# Patient Record
Sex: MALE | Race: WHITE | NOT HISPANIC OR LATINO | Employment: FULL TIME | URBAN - METROPOLITAN AREA
[De-identification: names, ages, dates, MRNs, and addresses within clinical notes are randomized per-mention and may not be internally consistent; named-entity substitution may affect disease eponyms.]

---

## 2017-01-09 ENCOUNTER — COMMUNICATION - HEALTHEAST (OUTPATIENT)
Dept: INTERNAL MEDICINE | Facility: CLINIC | Age: 52
End: 2017-01-09

## 2017-06-20 RX ORDER — MULTIPLE VITAMINS W/ MINERALS TAB 9MG-400MCG
1 TAB ORAL DAILY
COMMUNITY

## 2017-06-21 ENCOUNTER — HOSPITAL ENCOUNTER (OUTPATIENT)
Facility: CLINIC | Age: 52
Discharge: HOME | End: 2017-06-21
Attending: COLON & RECTAL SURGERY | Admitting: COLON & RECTAL SURGERY
Payer: COMMERCIAL

## 2017-06-21 ENCOUNTER — RECORDS - HEALTHEAST (OUTPATIENT)
Dept: ADMINISTRATIVE | Facility: OTHER | Age: 52
End: 2017-06-21

## 2017-06-21 VITALS
SYSTOLIC BLOOD PRESSURE: 128 MMHG | HEIGHT: 69 IN | RESPIRATION RATE: 25 BRPM | BODY MASS INDEX: 29.62 KG/M2 | WEIGHT: 200 LBS | OXYGEN SATURATION: 90 % | DIASTOLIC BLOOD PRESSURE: 91 MMHG

## 2017-06-21 PROCEDURE — 25000128 H RX IP 250 OP 636: Performed by: COLON & RECTAL SURGERY

## 2017-06-21 PROCEDURE — 99153 MOD SED SAME PHYS/QHP EA: CPT | Performed by: COLON & RECTAL SURGERY

## 2017-06-21 PROCEDURE — 45378 DIAGNOSTIC COLONOSCOPY: CPT | Performed by: COLON & RECTAL SURGERY

## 2017-06-21 PROCEDURE — G0121 COLON CA SCRN NOT HI RSK IND: HCPCS | Performed by: COLON & RECTAL SURGERY

## 2017-06-21 PROCEDURE — 25000125 ZZHC RX 250: Performed by: COLON & RECTAL SURGERY

## 2017-06-21 PROCEDURE — G0500 MOD SEDAT ENDO SERVICE >5YRS: HCPCS | Performed by: COLON & RECTAL SURGERY

## 2017-06-21 RX ORDER — ONDANSETRON 2 MG/ML
4 INJECTION INTRAMUSCULAR; INTRAVENOUS EVERY 6 HOURS PRN
Status: DISCONTINUED | OUTPATIENT
Start: 2017-06-21 | End: 2017-06-21 | Stop reason: HOSPADM

## 2017-06-21 RX ORDER — ONDANSETRON 4 MG/1
4 TABLET, ORALLY DISINTEGRATING ORAL EVERY 6 HOURS PRN
Status: DISCONTINUED | OUTPATIENT
Start: 2017-06-21 | End: 2017-06-21 | Stop reason: HOSPADM

## 2017-06-21 RX ORDER — FLUMAZENIL 0.1 MG/ML
0.2 INJECTION, SOLUTION INTRAVENOUS
Status: DISCONTINUED | OUTPATIENT
Start: 2017-06-21 | End: 2017-06-21 | Stop reason: HOSPADM

## 2017-06-21 RX ORDER — METOCLOPRAMIDE HYDROCHLORIDE 5 MG/ML
10 INJECTION INTRAMUSCULAR; INTRAVENOUS EVERY 6 HOURS PRN
Status: DISCONTINUED | OUTPATIENT
Start: 2017-06-21 | End: 2017-06-21 | Stop reason: HOSPADM

## 2017-06-21 RX ORDER — METOCLOPRAMIDE 10 MG/1
10 TABLET ORAL EVERY 6 HOURS PRN
Status: DISCONTINUED | OUTPATIENT
Start: 2017-06-21 | End: 2017-06-21 | Stop reason: HOSPADM

## 2017-06-21 RX ORDER — NALOXONE HYDROCHLORIDE 0.4 MG/ML
.1-.4 INJECTION, SOLUTION INTRAMUSCULAR; INTRAVENOUS; SUBCUTANEOUS
Status: DISCONTINUED | OUTPATIENT
Start: 2017-06-21 | End: 2017-06-21 | Stop reason: HOSPADM

## 2017-06-21 RX ORDER — ONDANSETRON 2 MG/ML
4 INJECTION INTRAMUSCULAR; INTRAVENOUS
Status: DISCONTINUED | OUTPATIENT
Start: 2017-06-21 | End: 2017-06-21 | Stop reason: HOSPADM

## 2017-06-21 RX ORDER — PROCHLORPERAZINE MALEATE 5 MG
5-10 TABLET ORAL EVERY 6 HOURS PRN
Status: DISCONTINUED | OUTPATIENT
Start: 2017-06-21 | End: 2017-06-21 | Stop reason: HOSPADM

## 2017-06-21 RX ORDER — LIDOCAINE 40 MG/G
CREAM TOPICAL
Status: DISCONTINUED | OUTPATIENT
Start: 2017-06-21 | End: 2017-06-21 | Stop reason: HOSPADM

## 2017-06-21 RX ORDER — FENTANYL CITRATE 50 UG/ML
INJECTION, SOLUTION INTRAMUSCULAR; INTRAVENOUS PRN
Status: DISCONTINUED | OUTPATIENT
Start: 2017-06-21 | End: 2017-06-21 | Stop reason: HOSPADM

## 2017-06-21 NOTE — H&P
"Pre-Endoscopy History and Physical   Rashawn Alonso MRN# [id number]   YOB: 1965 Age: 52 year old   Date of Procedure: 6/21/2017   Primary care provider: Rashawn Morgan   Type of Endoscopy: colonoscopy   Reason for Procedure: colonoscopy   Type of Anesthesia Anticipated: Moderate Sedation   HPI:   Rashawn is a 52 year old male for screening colonoscopy.  He last had a colonoscopy in 2007 which was normal.  He denies BRBPR, abdominal pain, nausea/vomiting, changes in bowel habits or unintentional weight loss.  He denies a FH of CRC.    No Known Allergies   Prior to Admission Medications   Prescriptions Last Dose Informant Patient Reported? Taking?   ASPIRIN PO 6/19/2017 at    Yes Yes   Sig: Take 81 mg by mouth daily   ATORVASTATIN CALCIUM PO 6/20/2017  Yes Yes   Sig: Take 10 mg by mouth daily   DOXYCYCLINE HYCLATE PO 6/21/2017  Yes Yes   Sig: Take 50 mg by mouth daily   multivitamin, therapeutic with minerals (MULTI-VITAMIN) TABS tablet 6/14/2017  Yes Yes   Sig: Take 1 tablet by mouth daily      Facility-Administered Medications: None      There is no problem list on file for this patient.     Past Medical History:   Diagnosis Date     A-fib (H)     ablation     Hypercholesteremia       Past Surgical History:   Procedure Laterality Date     COLONOSCOPY       EP ABLATION / EP STUDIES      for afib     HERNIA REPAIR        Social History   Substance Use Topics     Smoking status: Never Smoker     Smokeless tobacco: Not on file     Alcohol use Yes      Comment: social      History reviewed. No pertinent family history.   PHYSICAL EXAM:   /87  Resp 12  Ht 1.753 m (5' 9\")  Wt 90.7 kg (200 lb)  SpO2 94%  BMI 29.53 kg/m2 Estimated body mass index is 29.53 kg/(m^2) as calculated from the following:    Height as of this encounter: 1.753 m (5' 9\").    Weight as of this encounter: 90.7 kg (200 lb).   RESP: lungs clear to auscultation - no rales, rhonchi or wheezes   CV: regular rates and rhythm   ASA Class " 2 - Mild systemic disease    Assessment: 53 y/o gentleman for screening colonoscopy    Plan: Colonoscopy with moderate sedation.  Risks of the procedure were discussed including, but not limited to, bleeding, perforation and missed lesions.  Patient understands and is willing to proceed.    Brian Mistry MD ....................  6/21/2017   4:34 PM  Colon and Rectal Surgery Staff  [phone]

## 2017-06-22 LAB — COLONOSCOPY: NORMAL

## 2018-02-24 ENCOUNTER — COMMUNICATION - HEALTHEAST (OUTPATIENT)
Dept: INTERNAL MEDICINE | Facility: CLINIC | Age: 53
End: 2018-02-24

## 2018-04-17 ENCOUNTER — COMMUNICATION - HEALTHEAST (OUTPATIENT)
Dept: SCHEDULING | Facility: CLINIC | Age: 53
End: 2018-04-17

## 2018-05-22 ENCOUNTER — COMMUNICATION - HEALTHEAST (OUTPATIENT)
Dept: INTERNAL MEDICINE | Facility: CLINIC | Age: 53
End: 2018-05-22

## 2018-05-23 ENCOUNTER — OFFICE VISIT - HEALTHEAST (OUTPATIENT)
Dept: INTERNAL MEDICINE | Facility: CLINIC | Age: 53
End: 2018-05-23

## 2018-05-23 ENCOUNTER — COMMUNICATION - HEALTHEAST (OUTPATIENT)
Dept: INTERNAL MEDICINE | Facility: CLINIC | Age: 53
End: 2018-05-23

## 2018-05-23 DIAGNOSIS — Z00.00 HEALTH CARE MAINTENANCE: ICD-10-CM

## 2018-05-23 DIAGNOSIS — E78.5 HYPERLIPIDEMIA: ICD-10-CM

## 2018-05-23 DIAGNOSIS — S46.219A BICEPS RUPTURE, DISTAL: ICD-10-CM

## 2018-05-23 DIAGNOSIS — I34.0: ICD-10-CM

## 2018-05-23 LAB
ALBUMIN SERPL-MCNC: 4.1 G/DL (ref 3.5–5)
ALBUMIN UR-MCNC: NEGATIVE MG/DL
ALP SERPL-CCNC: 83 U/L (ref 45–120)
ALT SERPL W P-5'-P-CCNC: 30 U/L (ref 0–45)
ANION GAP SERPL CALCULATED.3IONS-SCNC: 12 MMOL/L (ref 5–18)
APPEARANCE UR: CLEAR
AST SERPL W P-5'-P-CCNC: 27 U/L (ref 0–40)
ATRIAL RATE - MUSE: 65 BPM
BASOPHILS # BLD AUTO: 0 THOU/UL (ref 0–0.2)
BASOPHILS NFR BLD AUTO: 1 % (ref 0–2)
BILIRUB DIRECT SERPL-MCNC: 0.3 MG/DL
BILIRUB SERPL-MCNC: 1.2 MG/DL (ref 0–1)
BILIRUB UR QL STRIP: NEGATIVE
BUN SERPL-MCNC: 13 MG/DL (ref 8–22)
CALCIUM SERPL-MCNC: 9.5 MG/DL (ref 8.5–10.5)
CHLORIDE BLD-SCNC: 106 MMOL/L (ref 98–107)
CHOLEST SERPL-MCNC: 158 MG/DL
CO2 SERPL-SCNC: 23 MMOL/L (ref 22–31)
COLOR UR AUTO: YELLOW
CREAT SERPL-MCNC: 0.75 MG/DL (ref 0.7–1.3)
DIASTOLIC BLOOD PRESSURE - MUSE: NORMAL MMHG
EOSINOPHIL # BLD AUTO: 0.1 THOU/UL (ref 0–0.4)
EOSINOPHIL NFR BLD AUTO: 2 % (ref 0–6)
ERYTHROCYTE [DISTWIDTH] IN BLOOD BY AUTOMATED COUNT: 12.8 % (ref 11–14.5)
ERYTHROCYTE [SEDIMENTATION RATE] IN BLOOD BY WESTERGREN METHOD: 9 MM/HR (ref 0–15)
FASTING STATUS PATIENT QL REPORTED: YES
GFR SERPL CREATININE-BSD FRML MDRD: >60 ML/MIN/1.73M2
GLUCOSE BLD-MCNC: 81 MG/DL (ref 70–125)
GLUCOSE UR STRIP-MCNC: NEGATIVE MG/DL
HCT VFR BLD AUTO: 44.8 % (ref 40–54)
HDLC SERPL-MCNC: 42 MG/DL
HGB BLD-MCNC: 15 G/DL (ref 14–18)
HGB UR QL STRIP: NEGATIVE
INTERPRETATION ECG - MUSE: NORMAL
KETONES UR STRIP-MCNC: NEGATIVE MG/DL
LDLC SERPL CALC-MCNC: 99 MG/DL
LEUKOCYTE ESTERASE UR QL STRIP: NEGATIVE
LYMPHOCYTES # BLD AUTO: 1.4 THOU/UL (ref 0.8–4.4)
LYMPHOCYTES NFR BLD AUTO: 24 % (ref 20–40)
MCH RBC QN AUTO: 31 PG (ref 27–34)
MCHC RBC AUTO-ENTMCNC: 33.4 G/DL (ref 32–36)
MCV RBC AUTO: 93 FL (ref 80–100)
MONOCYTES # BLD AUTO: 0.7 THOU/UL (ref 0–0.9)
MONOCYTES NFR BLD AUTO: 12 % (ref 2–10)
NEUTROPHILS # BLD AUTO: 3.5 THOU/UL (ref 2–7.7)
NEUTROPHILS NFR BLD AUTO: 61 % (ref 50–70)
NITRATE UR QL: NEGATIVE
P AXIS - MUSE: 71 DEGREES
PH UR STRIP: 6 [PH] (ref 5–8)
PLATELET # BLD AUTO: 256 THOU/UL (ref 140–440)
PMV BLD AUTO: 7.2 FL (ref 7–10)
POTASSIUM BLD-SCNC: 4.4 MMOL/L (ref 3.5–5)
PR INTERVAL - MUSE: 164 MS
PROT SERPL-MCNC: 7.4 G/DL (ref 6–8)
PSA SERPL-MCNC: 0.9 NG/ML (ref 0–3.5)
QRS DURATION - MUSE: 92 MS
QT - MUSE: 420 MS
QTC - MUSE: 436 MS
R AXIS - MUSE: -9 DEGREES
RBC # BLD AUTO: 4.83 MILL/UL (ref 4.4–6.2)
SODIUM SERPL-SCNC: 141 MMOL/L (ref 136–145)
SP GR UR STRIP: 1.01 (ref 1–1.03)
SYSTOLIC BLOOD PRESSURE - MUSE: NORMAL MMHG
T AXIS - MUSE: 12 DEGREES
TRIGL SERPL-MCNC: 87 MG/DL
TSH SERPL DL<=0.005 MIU/L-ACNC: 4.73 UIU/ML (ref 0.3–5)
UROBILINOGEN UR STRIP-ACNC: NORMAL
VENTRICULAR RATE- MUSE: 65 BPM
WBC: 5.7 THOU/UL (ref 4–11)

## 2018-05-23 ASSESSMENT — MIFFLIN-ST. JEOR: SCORE: 1666.52

## 2018-05-24 ENCOUNTER — COMMUNICATION - HEALTHEAST (OUTPATIENT)
Dept: INTERNAL MEDICINE | Facility: CLINIC | Age: 53
End: 2018-05-24

## 2018-06-15 ENCOUNTER — RECORDS - HEALTHEAST (OUTPATIENT)
Dept: ADMINISTRATIVE | Facility: OTHER | Age: 53
End: 2018-06-15

## 2018-06-20 ENCOUNTER — COMMUNICATION - HEALTHEAST (OUTPATIENT)
Dept: INTERNAL MEDICINE | Facility: CLINIC | Age: 53
End: 2018-06-20

## 2019-05-09 ENCOUNTER — COMMUNICATION - HEALTHEAST (OUTPATIENT)
Dept: INTERNAL MEDICINE | Facility: CLINIC | Age: 54
End: 2019-05-09

## 2019-05-09 DIAGNOSIS — E78.5 HYPERLIPIDEMIA: ICD-10-CM

## 2020-05-26 ENCOUNTER — COMMUNICATION - HEALTHEAST (OUTPATIENT)
Dept: INTERNAL MEDICINE | Facility: CLINIC | Age: 55
End: 2020-05-26

## 2020-05-26 DIAGNOSIS — E78.5 HYPERLIPIDEMIA: ICD-10-CM

## 2020-09-14 ENCOUNTER — OFFICE VISIT - HEALTHEAST (OUTPATIENT)
Dept: INTERNAL MEDICINE | Facility: CLINIC | Age: 55
End: 2020-09-14

## 2020-09-14 DIAGNOSIS — Z12.5 PROSTATE CANCER SCREENING: ICD-10-CM

## 2020-09-14 DIAGNOSIS — R03.0 ELEVATED BLOOD PRESSURE READING WITHOUT DIAGNOSIS OF HYPERTENSION: ICD-10-CM

## 2020-09-14 DIAGNOSIS — E78.5 HYPERLIPIDEMIA: ICD-10-CM

## 2020-09-14 DIAGNOSIS — Z23 NEED FOR DIPHTHERIA-TETANUS-PERTUSSIS (TDAP) VACCINE: ICD-10-CM

## 2020-09-14 LAB
CHOLEST SERPL-MCNC: 174 MG/DL
FASTING STATUS PATIENT QL REPORTED: YES
HDLC SERPL-MCNC: 43 MG/DL
LDLC SERPL CALC-MCNC: 110 MG/DL
PSA SERPL-MCNC: 0.8 NG/ML (ref 0–3.5)
TRIGL SERPL-MCNC: 104 MG/DL

## 2020-09-14 ASSESSMENT — MIFFLIN-ST. JEOR: SCORE: 1720.4

## 2020-09-15 ENCOUNTER — COMMUNICATION - HEALTHEAST (OUTPATIENT)
Dept: INTERNAL MEDICINE | Facility: CLINIC | Age: 55
End: 2020-09-15

## 2020-11-13 ENCOUNTER — COMMUNICATION - HEALTHEAST (OUTPATIENT)
Dept: INTERNAL MEDICINE | Facility: CLINIC | Age: 55
End: 2020-11-13

## 2020-11-13 DIAGNOSIS — I34.0: ICD-10-CM

## 2021-05-28 NOTE — TELEPHONE ENCOUNTER
Due to be seen    Rx renewed per Medication Renewal Policy. Medication was last renewed on 5/23/18.    Justa Clark, [facility] Triage/Med Refill 5/10/2019     Requested Prescriptions   Pending Prescriptions Disp Refills     atorvastatin (LIPITOR) 10 MG tablet [Pharmacy Med Name: ATORVASTATIN  10MG  TAB] 90 tablet 3     Sig: TAKE 1 TABLET BY MOUTH AT  BEDTIME       Statins Refill Protocol (Hmg CoA Reductase Inhibitors) Passed - 5/9/2019  8:03 PM        Passed - PCP or prescribing provider visit in past 12 months      Last office visit with prescriber/PCP: Visit date not found OR same dept: Visit date not found OR same specialty: Visit date not found  Last physical: 5/23/2018 Last MTM visit: Visit date not found   Next visit within 3 mo: Visit date not found  Next physical within 3 mo: Visit date not found  Prescriber OR PCP: Rashawn Morgan MD  Last diagnosis associated with med order: 1. Hyperlipidemia  - atorvastatin (LIPITOR) 10 MG tablet [Pharmacy Med Name: ATORVASTATIN  10MG  TAB]; TAKE 1 TABLET BY MOUTH AT  BEDTIME  Dispense: 90 tablet; Refill: 3    If protocol passes may refill for 12 months if within 3 months of last provider visit (or a total of 15 months).

## 2021-06-01 VITALS — BODY MASS INDEX: 29.27 KG/M2 | WEIGHT: 193.12 LBS | HEIGHT: 68 IN

## 2021-06-05 VITALS
DIASTOLIC BLOOD PRESSURE: 84 MMHG | HEIGHT: 68 IN | OXYGEN SATURATION: 97 % | BODY MASS INDEX: 31.07 KG/M2 | TEMPERATURE: 97.3 F | SYSTOLIC BLOOD PRESSURE: 132 MMHG | WEIGHT: 205 LBS | HEART RATE: 64 BPM

## 2021-06-08 NOTE — TELEPHONE ENCOUNTER
RN cannot approve Refill Request    RN can NOT refill this medication Protocol failed and NO refill given.      Justa Clark, [facility] Triage/Med Refill 5/28/2020    Requested Prescriptions   Pending Prescriptions Disp Refills     atorvastatin (LIPITOR) 10 MG tablet [Pharmacy Med Name: ATORVASTATIN TAB 10MG] 90 tablet 3     Sig: TAKE 1 TABLET AT BEDTIME       Statins Refill Protocol (Hmg CoA Reductase Inhibitors) Failed - 5/26/2020  8:14 AM        Failed - PCP or prescribing provider visit in past 12 months      Last office visit with prescriber/PCP: Visit date not found OR same dept: Visit date not found OR same specialty: Visit date not found  Last physical: 5/23/2018 Last MTM visit: Visit date not found   Next visit within 3 mo: Visit date not found  Next physical within 3 mo: Visit date not found  Prescriber OR PCP: Rashawn Morgan MD  Last diagnosis associated with med order: 1. Hyperlipidemia  - atorvastatin (LIPITOR) 10 MG tablet [Pharmacy Med Name: ATORVASTATIN TAB 10MG]; TAKE 1 TABLET AT BEDTIME  Dispense: 90 tablet; Refill: 3    If protocol passes may refill for 12 months if within 3 months of last provider visit (or a total of 15 months).

## 2021-06-11 NOTE — PROGRESS NOTES
[facility] Note  Rashawn Alonso   55 y.o. male    Date of Visit: 9/14/2020  Chief Complaint   Patient presents with     Establish Care     fasting     Assessment/Plan  1. Hyperlipidemia  Very well controlled on atorvastatin 10 mg daily.  Unable to find the initial LDL pre-taking statin, which he appears to been on since 2014.  Also continue to take 81 milligrams aspirin.  LDL stable at 110, HDL 43.  - atorvastatin (LIPITOR) 10 MG tablet; Take 1 tablet (10 mg total) by mouth at bedtime.  Dispense: 90 tablet; Refill: 3  - Lipid [place] FASTING    2. Need for diphtheria-tetanus-pertussis (Tdap) vaccine  - Tdap vaccine,  8yo or older,  IM    3. Prostate cancer screening  Stable, PSA 0.8  - PSA, Annual Screen (Prostatic-Specific Antigen)    4. Elevated blood pressure reading without diagnosis of hypertension  Optimal 10-year ASCVD event risk of 4.3%, currently at 6.9%.  Provided patient instructions on dietary and lifestyle changes to help improve his blood pressure.     Much or all of the text in this note was generated through the use of Dragon Dictate voice-to-text software. Errors in spelling or words which seem out of context are unintentional. Sound alike errors, in particular, may have escaped editing  Juan Manuel Yee MD    Return in about 1 year (around 9/14/2021), or if symptoms worsen or fail to improve.    Subjective  This 55 y.o. old male. Here to establish care. 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 Endorses 10-15 lb weight gain. Is a grandfather of a year old; his first grandchild. Wife just retired in July and his goal is to retire when 58 YOA. TTE in 2013 with EF 55% and mild to moderate mitral insufficiency. History of AFIb s/p ablations. Next colo is due in June 2027. Will have 1-2 diet cokes in the AM.  Denies past medical history of hypertension.  From chart review, does have documented elevated systolic blood pressures in the 120s, with periodic episodes in the 130s.   Denies any chest pain, SOB, fever, sweats or chills, nausea or palpitations.    ROS A comprehensive review of systems was performed and was otherwise negative    Medications, allergies, and problem list were reviewed and updated    Exam  General appearance: Pleasant, nontoxic-appearing, no acute distress, alert and oriented x4  Vitals:    09/14/20 1004   BP: 132/84   Pulse: 64   Temp: 97.3  F (36.3  C)   SpO2: 97%   EYES: Eyelids, conjunctiva, and sclera were normal.   RESPIRATORY: Bilaterally with no crackles, wheezing or rhonchi  CARDIOVASCULAR: Regular S1 and S2.  Radial pulses intact.  No edema.  GASTROINTESTINAL: NABS, soft, nontender, nondistended, no hepatomegaly  MUSCULOSKELETAL: Skeletal configuration was normal and muscle mass was normal for age. Joint appearance was overall normal.   SKIN/HAIR/NAILS: Skin color was normal.    Hair and nails were normal.  NEUROLOGIC: Alert and oriented to person, place, time, and circumstance. Speech was normal. Motor strength was normal for age   PSYCHIATRIC:  Mood and affect were normal and the patient had normal recent and remote memory.   Additional Information   Current Outpatient Medications   Medication Sig Dispense Refill     amoxicillin (AMOXIL) 500 MG tablet Take 1 tablet (500 mg total) by mouth daily. Take 4 tablet prior to dental work 12 tablet 3     aspirin 81 MG EC tablet Take 81 mg by mouth daily.       atorvastatin (LIPITOR) 10 MG tablet Take 1 tablet (10 mg total) by mouth at bedtime. 90 tablet 3     doxycycline (MONODOX) 50 MG capsule Take 50 mg by mouth daily. For blepharitis       MULTIVITAMIN (MULTIPLE VITAMIN ORAL) Take 1 tablet by mouth daily.       No current facility-administered medications for this visit.      No Known Allergies  Social History     Social History Narrative     - Perla -1991    Philipp-1992    Ian-1995    [employer]           Social History     Tobacco Use     Smoking status: Never  Smoker     Smokeless tobacco: Never Used   Substance Use Topics     Alcohol use: Not on file     Drug use: Not on file     Family History   Problem Relation Age of Onset     Heart disease Father      Other Father         bypass surgery     Diverticulitis Father      Hypertension Mother      Stroke Mother      Atrial fibrillation Mother      Kidney cancer Mother      Breast cancer Sister      Ovarian cancer Unknown         Family     Leukemia Sister         Acute     Past Surgical History:   Procedure Laterality Date     BILATERAL INGUINAL HERNIORRHAPY       RADIOFREQUENCY ABLATION      X 2     UMBILICAL HERNIA REPAIR       VASECTOMY      Time: total time spent with the patient was 25 minutes of which >50% was spent in counseling and coordination of care

## 2021-06-13 NOTE — TELEPHONE ENCOUNTER
Dr Park,     If ok please sign, thank    Edie Parks CMA ([facility])

## 2021-06-13 NOTE — TELEPHONE ENCOUNTER
Medication Request  Medication name:   amoxicillin (AMOXIL) 500 MG tablet 12 tablet 3 5/23/2018     Sig - Route: Take 1 tablet (500 mg total) by mouth daily. Take 4 tablet prior to dental work - Oral    Sent to pharmacy as: amoxicillin (AMOXIL) 500 MG tablet    E-Prescribing Status: Receipt confirmed by pharmacy (5/23/2018  2:05 PM CDT)        Requested [facility]acy: [facility]  Reason for request:   Upcoming dental work.  When did you use medication last?:    Unknown  Patient offered appointment:  patient declined  Okay to leave a detailed message: yes

## 2021-06-16 PROBLEM — R03.0 ELEVATED BLOOD PRESSURE READING WITHOUT DIAGNOSIS OF HYPERTENSION: Status: ACTIVE | Noted: 2020-09-15

## 2021-06-16 PROBLEM — E78.5 HYPERLIPIDEMIA: Status: ACTIVE | Noted: 2020-09-15

## 2021-06-18 NOTE — PROGRESS NOTES
HISTORY/PHYSICAL EXAM  Rashawn Alonso   53 y.o. male  Is here for a physical healthcare maintenance examination        Assessment/Plan for  Rashawn Alonso is a 53 y.o. male.       1.  Healthcare maintenance examination of the man healthy lifestyle  2.  Mitral valve insufficiency mild to moderate with ejection fraction of 50-55% 4 years ago.  We should recheck this.  Is a family history of clinically significant mitral regurg.  Refill amoxicillin.  3.  Blepharitis-on chronic doxycycline per ophthalmologist  4.  Hyperlipidemia-on Rx      Plan:  1.  Routine lab  2.  Amoxicillin refill  3.  Cardiac echo at [place]           There are no Patient Instructions on file for this visit.    Diagnoses and all orders for this visit:    Mitral valvular insufficiency  -     amoxicillin (AMOXIL) 500 MG tablet; Take 1 tablet (500 mg total) by mouth daily. Take 4 tablet prior to dental work  Dispense: 12 tablet; Refill: 3  -     Echo Complete; Future; Expected date: 5/23/18    Health care maintenance  -     [facility](CBC and Differential)  -     Erythrocyte Sedimentation Rate  -     Urinalysis-UC if Indicated  -     Basic Metabolic Panel  -     Hepatic Profile  -     Lipid [place]  -     Thyroid [place]  -     PSA (Prostatic-Specific Antigen), Annual Screen  -     Electrocardiogram Perform - Clinic  -     [facility] (CBC with Diff)    Hyperlipidemia  -     atorvastatin (LIPITOR) 10 MG tablet; Take 1 tablet (10 mg total) by mouth at bedtime.  Dispense: 90 tablet; Refill: 3    Biceps rupture, distal            Medications:  Medications after visit  Current Outpatient Prescriptions   Medication Sig Dispense Refill     amoxicillin (AMOXIL) 500 MG tablet Take 1 tablet (500 mg total) by mouth daily. Take 4 tablet prior to dental work 12 tablet 3     aspirin 81 MG EC tablet Take 81 mg by mouth daily.       atorvastatin (LIPITOR) 10 MG tablet Take 1 tablet (10 mg total) by mouth at bedtime. 90 tablet 3     doxycycline (MONODOX) 50 MG capsule Take 50 mg by  mouth daily. For blepharitis       MULTIVITAMIN (MULTIPLE VITAMIN ORAL) Take 1 tablet by mouth daily.       No current facility-administered medications for this visit.      In addition to the physical examination.  We discussed his mitral valve regurgitation.  We discussed his history of A. fib.  I sent in a prescription for prophylactic amoxicillin  This provider spent greater than 10 min. face-to-face time with the patient and/or his family.  More than half this time was spent in counseling and or coordination of care which was consistent with the nature of this patient's problems which are listed and described in the assessment and plan.    Rashawn Morgan MD  Internal medicine  [facility]  [phone]  [email or web address]      This is an electronically verified report by Rashawn Morgan M.D.  (Note created with Dragon voice recognition and unintended spelling errors and word substitutions may occur)        SUBJECTIVE/HPI    Chief Complaint:  Annual Exam (Fasting)  Patient in for annual exam    He is healthy he is doing well    He has a history of mitral regurgitation  Clinically asymptomatic  Distant ablation ×3.  No clinical recurrence  No TIA symptomatology  He is on SBE prophylaxis  His mother had severe significant mitral regurgitation on the basis of mitral valve prolapse.  She had embolic stroke.    He otherwise feels well        Review of Systems:   Extensive 14-point comprehensive review of systems was performed.   Patient reports  1. Good Appetite  2 . Regular BM   He has a history of bicipital pain ×2 short-lived  Most recent incident past October.    Otherwise, the following systems are negative including constitutional, eyes, ears, nose and throat, cardiovascular, respiratory, gastrointestinal, genitourinary, musculoskeletal,neurological, skin and/or breast, endocrine, hematologic/lymph, allergic/immunologic and psychiatric.  Surgical History  Past Surgical  History:   Procedure Laterality Date     BILATERAL INGUINAL HERNIORRHAPY       RADIOFREQUENCY ABLATION      X 2     UMBILICAL HERNIA REPAIR       VASECTOMY         Medical History     Past Medical History:   Diagnosis Date     Atrial fibrillation, controlled      Cardiomyopathy, secondary      H/O cardiac radiofrequency ablation 2000     Mitral insufficiency      S/P pulmonary vein repair 2010     S/P pulmonary vein repair 2011     There are no active problems to display for this patient.       Family History  Family History   Problem Relation Age of Onset     Heart disease Father      Other Father      bypass surgery     Diverticulitis Father      Hypertension Mother      Stroke Mother      Atrial fibrillation Mother      Kidney cancer Mother      Breast cancer Sister      Ovarian cancer       Family     Leukemia Sister      Acute             Medications:  Current Outpatient Prescriptions on File Prior to Visit   Medication Sig     doxycycline (MONODOX) 50 MG capsule Take 50 mg by mouth daily. For blepharitis     MULTIVITAMIN (MULTIPLE VITAMIN ORAL) Take 1 tablet by mouth daily.     [DISCONTINUED] amoxicillin (AMOXIL) 500 MG tablet Take 500 mg by mouth daily. Take 4 tablet prior to dental work     [DISCONTINUED] aspirin 325 MG tablet Take 325 mg by mouth daily.     [DISCONTINUED] atorvastatin (LIPITOR) 10 MG tablet Take 1 tablet (10 mg total) by mouth at bedtime.     No current facility-administered medications on file prior to visit.           Allergies:  No Known Allergies    PSFHx:   Social History     Social History     Marital status:      Spouse name: N/A     Number of children: N/A     Years of education: N/A     Occupational History     Not on file.     Social History Main Topics     Smoking status: Never Smoker     Smokeless tobacco: Never Used     Alcohol use Not on file     Drug use: Not on file     Sexual activity: Not on file     Other Topics Concern     Not on file     Social History Narrative  "    - Perla -1991    Philipp-1992    Ian-1995    [employer]                Objective:   /70  Pulse 75  Resp 16  Ht 5' 7.75\" (1.721 m)  Wt 193 lb 1.9 oz (87.6 kg)  SpO2 96%  BMI 29.58 kg/m2  Weight:   Wt Readings from Last 3 Encounters:   05/23/18 193 lb 1.9 oz (87.6 kg)     Pleasant individual  Well-groomed  General-appears well, no acute distress.  Skin: Normal. No rash or lesion  Lymph Nodes: None palpable-including neck, axilla, inguinal, epitrochlear.  Head:  Normocephalic.    Eyes: Midline.  Equal size., full ROM.  External exams normal.  No icterus  Ears:  Normal pinnae, canals, and TM's.    Nose:  Patent, without deformity.    Throat:  Moist mucous membranes without lesions, erythema, or exudate.    Neck: No palpable masses, lymphadenopathy or tenderness.No thyromegaly or goiter.  No thyroid nodule.  Carotid Arteries:  No Bruit.  Carotid upstroke normal  Chest Wall: No deformity or pain elicited on compression.  Respiratory:  Normal respiratory effort.  Lungs are clear with good breath sounds.  No dullness.  No wheezing.  Heart: Regular rhythm.  Normal sounding S1, S2 without S3, S4, murmurs, rubs, or gallops.  I hear no mitral regurgitation  Abdomen:  The abdomen was flat, soft and nontender without guarding rebound or masses.  There are normal bowel sounds.  There is no hepatosplenomegaly.  There is no palpable enlargement of the aorta.  Genitalia:  Circumcised male without penile or testicular lesions.  No hernia.  Rectal/Prostate:  No external lesions.  Sphincter tone normal.  No palpable rectal lesions.    Prostate 2/4 BPH without nodule, smooth, nontender without palpable lesions.  Extremities:  Full ROM without limitation, deformity or edema.    4+ pulses  Neurologic-intact- No focal deficit.  Speech clear.  Coordination normal.  Strength symmetric  Orthopedic-no arthropathy.  Lizandro muscle bilaterally.  Strength " normal          Laboratory: ordered      Recent Results (from the past 24 hour(s))   Electrocardiogram Perform - Clinic   Result Value Ref Range    SYSTOLIC BLOOD PRESSURE  mmHg    DIASTOLIC BLOOD PRESSURE  mmHg    VENTRICULAR RATE 65 BPM    ATRIAL RATE 65 BPM    P-R INTERVAL 164 ms    QRS DURATION 92 ms    Q-T INTERVAL 420 ms    QTC CALCULATION (BEZET) 436 ms    P Axis 71 degrees    R AXIS -9 degrees    T AXIS 12 degrees    MUSE DIAGNOSIS       Normal sinus rhythm  Normal ECG  No previous ECGs available          Electrocardiogram:      EJECTION FRACTION 50-55%     Result Narrative    ECHO COMPLETE WO CONTRAST TIANNA PACKER 2013 08:35     [facility] [address], [place] [address]   Main: [phone] [email or web address]     Transthoracic Echo Report   TIANNA PACKER   Excellian ID: [id number] Age: 48 : 1965 Ordering Provider: AMIRA ROSALES   Exam Date: 2013 08:35 Gender: M Sonographer:    Accession #: [id number] Height: 69 in BSA: 1.98 m  BP: 122 / 76   Weight: 180 lbs BMI: 26.6 kg/m    [facility]   Procedure: ECHO COMPLETE WO CONTRAST   Indications: Cardiomyopathy   Technical Quality: Adequate Contrast: None     Final Conclusion   1)  Normal global left ventricular systolic function without regional wall motion abnormality.    Estimated ejection fraction ~ 55%     2)  Mild to moderate mitral insufficiency     3)  Mild tricuspid insufficiency   Estimated EF: 50-55%   FINDINGS

## 2021-06-18 NOTE — PATIENT INSTRUCTIONS - HE
Patient Instructions by Juan Manuel Yee MD at 9/14/2020 10:00 AM     Author: Juan Manuel Yee MD Service: -- Author Type: Physician    Filed: 9/14/2020 10:47 AM Encounter Date: 9/14/2020 Status: Addendum    : Juan Manuel Yee MD (Physician)    Related Notes: Original Note by Juan Manuel Yee MD (Physician) filed at 9/14/2020 10:44 AM             High Blood Pressure    High blood pressure (hypertension) is a chronic disease. Often, healthcare providers dont know what causes it. But it can be caused by certain health conditions and medicines.  If you have high blood pressure, you may not have any symptoms. If you do have symptoms, they may include headache, dizziness, changes in your vision, chest pain, and shortness of breath. But even without symptoms, high blood pressure thats not treated raises your risk for heart attack, heart failure, and stroke. High blood pressure is a serious health risk and shouldnt be ignored.  Blood pressure measurements are given as 2 numbers. Systolic blood pressure is the upper number. This is the pressure when the heart contracts. Diastolic blood pressure is the lower number. This is the pressure when the heart relaxes between beats. You will see your blood pressure readings written together. For example, a person with a systolic pressure of 118 and a diastolic pressure of 78 will have 118/78 written in the medical record.  Blood pressure is categorized as normal, elevated, or stage 1 or stage 2 high blood pressure:    Normal blood pressure is systolic of less than 120 and diastolic of less than 80 (120/80)    Elevated blood pressure is systolic of 120 to 129 and diastolic less than 80    Stage 1 high blood pressure is systolic is 130 to 139 or diastolic between 80 to 89    Stage 2 high blood pressure is when systolic is 140 or higher or the diastolic is 90 or higher  Home care  If you have high blood pressure, follow these home care  guidelines to help lower your blood pressure. If you are taking medicines for high blood pressure, these methods may reduce or end your need for medicines in the future.    Start a weight-loss program if you are overweight.    Cut back on how much salt you get in your diet. Heres how to do this:  ? Dont eat foods that have a lot of salt. These include olives, pickles, smoked meats, and salted potato chips.  ? Dont add salt to your food at the table.  ? Use only small amounts of salt when cooking.    Start an exercise program. Talk with your healthcare provider about the type of exercise program that would be best for you. It doesn't have to be hard. Even brisk walking for 20 minutes 3 times a week is a good form of exercise.    Limit how much caffeine you get in your diet. Switch to caffeine-free products.    Learn how to handle stress. This is an important part of any program to lower blood pressure. Learn about relaxation methods like meditation, yoga, or biofeedback.    If your provider prescribed medicines, take them exactly as directed. Missing doses may cause your blood pressure get out of control.    If you miss a dose or doses, check with your healthcare provider or pharmacist about what to do.    Consider buying an automatic blood pressure machine to check your blood pressure at home. Ask your provider for a recommendation. You can get one of these at most pharmacies.  The American Heart Association recommends the following guidelines for home blood pressure monitoring:    Don't smoke or drink coffee for 30 minutes before taking your blood pressure.    Go to the bathroom before the test.    Relax for 5 minutes before taking the measurement.    Sit with your back supported (don't sit on a couch or soft chair); keep your feet on the floor uncrossed. Place your arm on a solid flat surface (like a table) with the upper part of the arm at heart level. Place the middle of the cuff directly above the bend of the  elbow. Check the monitor's instruction manual for an illustration.    Take multiple readings. When you measure, take 2 to 3 readings one minute apart and record all of the results.    Take your blood pressure at the same time every day, or as your healthcare provider recommends.    Record the date, time, and blood pressure reading.    Take the record with you to your next medical appointment. If your blood pressure monitor has a built-in memory, simply take the monitor with you to your next appointment.    Call your provider if you have several high readings. Don't be frightened by a single high blood pressure reading, but if you get several high readings, check in with your healthcare provider.    Note: When blood pressure reaches a systolic (top number) of 180 or higher OR diastolic (bottom number) of 110 or higher, seek emergency medical treatment.  Follow-up care  You will need to see your healthcare provider regularly. This is to check your blood pressure and to make changes to your medicines. Make a follow-up appointment as directed. Bring the record of your home blood pressure readings to the appointment.  When to seek medical advice  Call your healthcare provider right away if any of these occur:    Blood pressure reaches a systolic (upper number) of 180 or higher OR a diastolic (bottom number) of 110 or higher    Chest pain or shortness of breath    Severe headache    Throbbing or rushing sound in the ears    Nosebleed    Sudden severe pain in your belly (abdomen)    Extreme drowsiness, confusion, or fainting    Dizziness or spinning sensation (vertigo)    Weakness of an arm or leg or one side of the face    You have problems speaking or seeing

## 2021-06-20 NOTE — LETTER
Letter by Juan Manuel Yee MD at      Author: Juan Manuel Yee MD Service: -- Author Type: --    Filed:  Encounter Date: 9/15/2020 Status: (Other)         Rashawn Alonso  [id number]  [facility] [address]     September 15, 2020     Dear Mr. Alonso,    Below are the results from your recent visit:    Resulted Orders   Lipid Independence FASTING   Result Value Ref Range    Cholesterol 174 <=199 mg/dL    Triglycerides 104 <=149 mg/dL    HDL Cholesterol 43 >=40 mg/dL    LDL Calculated 110 <=129 mg/dL    Patient Fasting > 8hrs? Yes    PSA, Annual Screen (Prostatic-Specific Antigen)   Result Value Ref Range    PSA 0.8 0.0 - 3.5 ng/mL    Narrative    Method is Abbott Prostate-Specific Antigen (PSA)  Standard-WHO 1st International (90:10)     Your PSA and cholesterol labs look good and are stable.    Please call with questions or contact us using [employer].    Sincerely,        Electronically signed by Juan Manuel Yee MD

## 2021-07-07 ENCOUNTER — COMMUNICATION - HEALTHEAST (OUTPATIENT)
Dept: INTERNAL MEDICINE | Facility: CLINIC | Age: 56
End: 2021-07-07

## 2021-07-07 DIAGNOSIS — E78.5 HYPERLIPIDEMIA: ICD-10-CM

## 2021-07-07 NOTE — TELEPHONE ENCOUNTER
Telephone Encounter by Lety Perez RN at 7/7/2021  7:48 AM     Author: Lety Perez RN Service: -- Author Type: Registered Nurse    Filed: 7/7/2021  7:49 AM Encounter Date: 7/6/2021 Status: Signed    : Lety Perez RN (Registered Nurse)       RN cannot approve Refill Request    Former patient of Juan Manuel Yee MD & has not established care with another provider.  Please assign refill request to covering provider per Clinic standard process.    RN can NOT refill this medication NO PCP. Last office visit: 9/14/2020 Juan Manuel Yee MD Last Physical: Visit date not found Last MTM visit: Visit date not found Last visit same specialty: 9/14/2020 Juan Manuel Yee MD.  Next visit within 3 mo: Visit date not found  Next physical within 3 mo: Visit date not found      Lety Perez, [facility] Connection Triage/Med Refill 7/7/2021    Requested Prescriptions   Pending Prescriptions Disp Refills   ? atorvastatin (LIPITOR) 10 MG tablet [Pharmacy Med Name: ATORVASTATIN  10MG  TAB] 90 tablet 3     Sig: TAKE 1 TABLET BY MOUTH AT  BEDTIME       Statins Refill Protocol (Hmg CoA Reductase Inhibitors) Passed - 7/6/2021  8:20 PM        Passed - PCP or prescribing provider visit in past 12 months      Last office visit with prescriber/PCP: 9/14/2020 Juan Manuel Yee MD OR same dept: 9/14/2020 Juan Manuel Yee MD OR same specialty: 9/14/2020 Juan Manuel Yee MD  Last physical: Visit date not found Last MTM visit: Visit date not found   Next visit within 3 mo: Visit date not found  Next physical within 3 mo: Visit date not found  Prescriber OR PCP: Juan Manuel Yee MD  Last diagnosis associated with med order: 1. Hyperlipidemia  - atorvastatin (LIPITOR) 10 MG tablet [Pharmacy Med Name: ATORVASTATIN  10MG  TAB]; TAKE 1 TABLET BY MOUTH AT  BEDTIME  Dispense: 90 tablet; Refill: 3    If protocol passes may refill for 12 months if within 3 months of  last provider visit (or a total of 15 months).

## 2021-07-07 NOTE — TELEPHONE ENCOUNTER
Telephone Encounter by Jazmine Gaxiola CMA at 7/7/2021  1:00 PM     Author: Jazmine Gaxiola CMA Service: -- Author Type: Certified Medical Assistant    Filed: 7/7/2021  1:00 PM Encounter Date: 7/6/2021 Status: Signed    : Jazmine Gaxiola CMA (Certified Medical Assistant)       Called pt and he does not need a refill at this time. He will call to schedule an appointment when he is closer to needing a refill.

## 2021-08-02 RX ORDER — ATORVASTATIN CALCIUM 10 MG/1
10 TABLET, FILM COATED ORAL DAILY
Qty: 30 TABLET | Refills: 0 | Status: CANCELLED | OUTPATIENT
Start: 2021-08-02

## 2021-08-05 NOTE — TELEPHONE ENCOUNTER
"atorvastatin (LIPITOR) 10 MG tablet 90 tablet 3 9/14/2020  No   Sig - Route: Take 1 tablet (10 mg total) by mouth at bedtime. - Oral   Sent to pharmacy as: atorvastatin 10 mg tablet (LIPITOR)   E-Prescribing Status: Receipt confirmed by pharmacy (9/14/2020 10:36 AM CDT)   atorvastatin (LIPITOR) 10 MG tablet [[id number]]    Electronically signed by: Juan Manuel Yee MD on 09/14/20 1036 Status: Active   Ordering user: Juan Manuel Yee MD 09/14/20 1036 Authorized by: Juan Manuel Yee MD   Frequency: QHS 09/14/20 - Until Discontinued Released by: Juan Manuel Yee MD 09/14/20 1036   Diagnoses  Hyperlipidemia [E78.5]     Former patient of Nakia & has not established care with another provider.  Please assign refill request to covering provider per Clinic standard process.    Routing refill request to provider for review/approval because:  No pcp    Last Written Prescription Date:  9/14/20  Last Fill Quantity: 90,  # refills: 3   Last office visit provider:  9/14/20     Requested Prescriptions   Pending Prescriptions Disp Refills     atorvastatin (LIPITOR) 10 MG tablet 30 tablet 0     Sig: Take 1 tablet (10 mg) by mouth daily       Statins Protocol Failed - 8/2/2021 10:48 AM        Failed - LDL on file in past 12 months     Recent Labs   Lab Test 09/14/20  1054                Passed - No abnormal creatine kinase in past 12 months     No lab results found.             Passed - Recent (12 mo) or future (30 days) visit within the authorizing provider's specialty     Patient has had an office visit with the authorizing provider or a provider within the authorizing providers department within the previous 12 mos or has a future within next 30 days. See \"Patient Info\" tab in inbasket, or \"Choose Columns\" in Meds & Orders section of the refill encounter.              Passed - Medication is active on med list        Passed - Patient is age 18 or older             Brian Hall RN " 08/05/21 2:23 PM

## 2021-08-09 NOTE — TELEPHONE ENCOUNTER
Per 7/7/21 encounter patient will call when refill is needed. He needs to schedule a visit with a new PCP  Sara Millard CSS

## 2021-08-23 DIAGNOSIS — E78.2 MIXED HYPERLIPIDEMIA: Primary | ICD-10-CM

## 2021-08-23 RX ORDER — ATORVASTATIN CALCIUM 10 MG/1
10 TABLET, FILM COATED ORAL DAILY
Qty: 90 TABLET | Refills: 0 | Status: SHIPPED | OUTPATIENT
Start: 2021-08-23 | End: 2021-09-30

## 2021-08-23 NOTE — TELEPHONE ENCOUNTER
Call pt- he is due for follow up next month and needs to establish care with a new provider. Please schedule.

## 2021-09-30 ENCOUNTER — OFFICE VISIT (OUTPATIENT)
Dept: FAMILY MEDICINE | Facility: CLINIC | Age: 56
End: 2021-09-30
Payer: COMMERCIAL

## 2021-09-30 VITALS
BODY MASS INDEX: 31.07 KG/M2 | DIASTOLIC BLOOD PRESSURE: 87 MMHG | HEIGHT: 68 IN | WEIGHT: 205 LBS | HEART RATE: 81 BPM | TEMPERATURE: 98.6 F | SYSTOLIC BLOOD PRESSURE: 134 MMHG

## 2021-09-30 DIAGNOSIS — E78.2 MIXED HYPERLIPIDEMIA: ICD-10-CM

## 2021-09-30 DIAGNOSIS — Z76.89 ENCOUNTER TO ESTABLISH CARE: Primary | ICD-10-CM

## 2021-09-30 PROBLEM — R03.0 ELEVATED BLOOD PRESSURE READING WITHOUT DIAGNOSIS OF HYPERTENSION: Status: RESOLVED | Noted: 2020-09-15 | Resolved: 2021-09-30

## 2021-09-30 LAB
ALBUMIN SERPL-MCNC: 4.2 G/DL (ref 3.5–5)
ALP SERPL-CCNC: 80 U/L (ref 45–120)
ALT SERPL W P-5'-P-CCNC: 39 U/L (ref 0–45)
AST SERPL W P-5'-P-CCNC: 38 U/L (ref 0–40)
BILIRUB DIRECT SERPL-MCNC: 0.4 MG/DL
BILIRUB SERPL-MCNC: 1.2 MG/DL (ref 0–1)
PROT SERPL-MCNC: 7.7 G/DL (ref 6–8)

## 2021-09-30 PROCEDURE — 80076 HEPATIC FUNCTION PANEL: CPT | Performed by: FAMILY MEDICINE

## 2021-09-30 PROCEDURE — 36415 COLL VENOUS BLD VENIPUNCTURE: CPT | Performed by: FAMILY MEDICINE

## 2021-09-30 PROCEDURE — 99203 OFFICE O/P NEW LOW 30 MIN: CPT | Performed by: FAMILY MEDICINE

## 2021-09-30 RX ORDER — FAMOTIDINE 20 MG
4000 TABLET ORAL
COMMUNITY
End: 2024-05-21

## 2021-09-30 RX ORDER — AMOXICILLIN 500 MG/1
TABLET, FILM COATED ORAL
COMMUNITY
Start: 2020-11-13 | End: 2022-11-29

## 2021-09-30 RX ORDER — ATORVASTATIN CALCIUM 10 MG/1
10 TABLET, FILM COATED ORAL DAILY
Qty: 90 TABLET | Refills: 3 | Status: SHIPPED | OUTPATIENT
Start: 2021-09-30 | End: 2022-09-19

## 2021-09-30 ASSESSMENT — MIFFLIN-ST. JEOR: SCORE: 1720.4

## 2021-09-30 NOTE — PROGRESS NOTES
"    Assessment & Plan     Encounter to establish care  Reviewed and updated patient's history including PMH, surgical, family, allergies.    Mixed hyperlipidemia  Doing well on atorvastatin, refills provided.  Check monitoring liver panel today.  The 10-year ASCVD risk score (Ute BRYANT Jr., et al., 2013) is: 7.7%    Values used to calculate the score:      Age: 56 years      Sex: Male      Is Non- : No      Diabetic: No      Tobacco smoker: No      Systolic Blood Pressure: 134 mmHg      Is BP treated: No      HDL Cholesterol: 43 mg/dL      Total Cholesterol: 174 mg/dL  - atorvastatin (LIPITOR) 10 MG tablet; Take 1 tablet (10 mg) by mouth daily  - Hepatic panel (Albumin, ALT, AST, Bili, Alk Phos, TP); Future    DO DIAZ Ibrahim [facility]    Jordin Morocho is a 56 year old who presents for the following health issues  Chief Complaint   Patient presents with     Establish Care     provider retired     Recheck Medication     refill of lipitor needed     HPI           Objective    /87   Pulse 81   Temp 98.6  F (37  C) (Oral)   Ht 1.721 m (5' 7.75\")   Wt 93 kg (205 lb)   BMI 31.40 kg/m    Body mass index is 31.4 kg/m .  Physical Exam   GENERAL: healthy, alert and no distress  EYES: Eyes grossly normal to inspection, PERRL and conjunctivae and sclerae normal  RESP: lungs clear to auscultation - no rales, rhonchi or wheezes  CV: regular rate and rhythm, normal S1 S2, no S3 or S4, no murmur, click or rub, no peripheral edema and peripheral pulses strong  MS: no gross musculoskeletal defects noted, no edema  PSYCH: mentation appears normal, affect normal/bright        "

## 2021-09-30 NOTE — LETTER
October 1, 2021      Rashawn BECKY Alonso  [address] [facility] [address]        Dear ,    We are writing to inform you of your test results.    Your recent blood work was reassuring. Your total bilirubin was slightly elevated but not clinically significant.     Resulted Orders   Hepatic panel (Albumin, ALT, AST, Bili, Alk Phos, TP)   Result Value Ref Range    Bilirubin Total 1.2 (H) 0.0 - 1.0 mg/dL    Bilirubin Direct 0.4 <=0.5 mg/dL    Protein Total 7.7 6.0 - 8.0 g/dL    Albumin 4.2 3.5 - 5.0 g/dL    Alkaline Phosphatase 80 45 - 120 U/L    AST 38 0 - 40 U/L    ALT 39 0 - 45 U/L       If you have any questions or concerns, please call the clinic at the number listed above.       Sincerely,      Melyssa Stoll, DO

## 2022-07-30 ENCOUNTER — TELEPHONE (OUTPATIENT)
Dept: OPHTHALMOLOGY | Facility: CLINIC | Age: 57
End: 2022-07-30

## 2022-07-30 PROCEDURE — 96360 HYDRATION IV INFUSION INIT: CPT | Performed by: EMERGENCY MEDICINE

## 2022-07-30 PROCEDURE — 99285 EMERGENCY DEPT VISIT HI MDM: CPT | Performed by: EMERGENCY MEDICINE

## 2022-07-30 PROCEDURE — 96361 HYDRATE IV INFUSION ADD-ON: CPT | Performed by: EMERGENCY MEDICINE

## 2022-07-30 PROCEDURE — 99285 EMERGENCY DEPT VISIT HI MDM: CPT | Mod: 25 | Performed by: EMERGENCY MEDICINE

## 2022-07-30 PROCEDURE — C9803 HOPD COVID-19 SPEC COLLECT: HCPCS | Performed by: EMERGENCY MEDICINE

## 2022-07-31 ENCOUNTER — HOSPITAL ENCOUNTER (OUTPATIENT)
Facility: CLINIC | Age: 57
Discharge: HOME | End: 2022-07-31
Attending: EMERGENCY MEDICINE | Admitting: OPHTHALMOLOGY
Payer: COMMERCIAL

## 2022-07-31 ENCOUNTER — ANESTHESIA (OUTPATIENT)
Dept: SURGERY | Facility: CLINIC | Age: 57
End: 2022-07-31
Payer: COMMERCIAL

## 2022-07-31 ENCOUNTER — ANESTHESIA EVENT (OUTPATIENT)
Dept: SURGERY | Facility: CLINIC | Age: 57
End: 2022-07-31
Payer: COMMERCIAL

## 2022-07-31 VITALS
TEMPERATURE: 98.1 F | WEIGHT: 199.8 LBS | RESPIRATION RATE: 16 BRPM | SYSTOLIC BLOOD PRESSURE: 128 MMHG | BODY MASS INDEX: 30.6 KG/M2 | OXYGEN SATURATION: 95 % | HEART RATE: 80 BPM | DIASTOLIC BLOOD PRESSURE: 90 MMHG

## 2022-07-31 DIAGNOSIS — H33.22 LEFT RETINAL DETACHMENT: Primary | ICD-10-CM

## 2022-07-31 DIAGNOSIS — Z20.822 COVID-19 RULED OUT BY LABORATORY TESTING: ICD-10-CM

## 2022-07-31 LAB — SARS-COV-2 RNA RESP QL NAA+PROBE: NEGATIVE

## 2022-07-31 PROCEDURE — 250N000011 HC RX IP 250 OP 636

## 2022-07-31 PROCEDURE — 370N000017 HC ANESTHESIA TECHNICAL FEE, PER MIN: Performed by: OPHTHALMOLOGY

## 2022-07-31 PROCEDURE — 272N000001 HC OR GENERAL SUPPLY STERILE: Performed by: OPHTHALMOLOGY

## 2022-07-31 PROCEDURE — 250N000013 HC RX MED GY IP 250 OP 250 PS 637: Performed by: OPHTHALMOLOGY

## 2022-07-31 PROCEDURE — 67108 REPAIR DETACHED RETINA: CPT | Mod: LT | Performed by: OPHTHALMOLOGY

## 2022-07-31 PROCEDURE — 250N000009 HC RX 250: Performed by: STUDENT IN AN ORGANIZED HEALTH CARE EDUCATION/TRAINING PROGRAM

## 2022-07-31 PROCEDURE — 999N000141 HC STATISTIC PRE-PROCEDURE NURSING ASSESSMENT: Performed by: OPHTHALMOLOGY

## 2022-07-31 PROCEDURE — 250N000025 HC SEVOFLURANE, PER MIN: Performed by: OPHTHALMOLOGY

## 2022-07-31 PROCEDURE — 258N000003 HC RX IP 258 OP 636: Performed by: EMERGENCY MEDICINE

## 2022-07-31 PROCEDURE — 258N000003 HC RX IP 258 OP 636

## 2022-07-31 PROCEDURE — C1784 OCULAR DEV, INTRAOP, DET RET: HCPCS | Performed by: OPHTHALMOLOGY

## 2022-07-31 PROCEDURE — 710N000012 HC RECOVERY PHASE 2, PER MINUTE: Performed by: OPHTHALMOLOGY

## 2022-07-31 PROCEDURE — 250N000011 HC RX IP 250 OP 636: Performed by: OPHTHALMOLOGY

## 2022-07-31 PROCEDURE — 67145 PROPH RTA DTCHMNT PC: CPT | Mod: XS | Performed by: OPHTHALMOLOGY

## 2022-07-31 PROCEDURE — 250N000009 HC RX 250: Performed by: OPHTHALMOLOGY

## 2022-07-31 PROCEDURE — 250N000009 HC RX 250

## 2022-07-31 PROCEDURE — 360N000077 HC SURGERY LEVEL 4, PER MIN: Performed by: OPHTHALMOLOGY

## 2022-07-31 PROCEDURE — 250N000013 HC RX MED GY IP 250 OP 250 PS 637: Performed by: STUDENT IN AN ORGANIZED HEALTH CARE EDUCATION/TRAINING PROGRAM

## 2022-07-31 PROCEDURE — 710N000010 HC RECOVERY PHASE 1, LEVEL 2, PER MIN: Performed by: OPHTHALMOLOGY

## 2022-07-31 PROCEDURE — 87635 SARS-COV-2 COVID-19 AMP PRB: CPT | Performed by: EMERGENCY MEDICINE

## 2022-07-31 DEVICE — EYE IMP SLEEVE OVAL STYLE 3084 S3084: Type: IMPLANTABLE DEVICE | Site: EYE | Status: FUNCTIONAL

## 2022-07-31 DEVICE — EYE IMP STRIP STYLE 4050 S4050: Type: IMPLANTABLE DEVICE | Site: EYE | Status: FUNCTIONAL

## 2022-07-31 RX ORDER — SODIUM CHLORIDE, SODIUM LACTATE, POTASSIUM CHLORIDE, CALCIUM CHLORIDE 600; 310; 30; 20 MG/100ML; MG/100ML; MG/100ML; MG/100ML
INJECTION, SOLUTION INTRAVENOUS CONTINUOUS PRN
Status: DISCONTINUED | OUTPATIENT
Start: 2022-07-31 | End: 2022-07-31

## 2022-07-31 RX ORDER — BRIMONIDINE TARTRATE 1.5 MG/ML
SOLUTION/ DROPS OPHTHALMIC PRN
Status: DISCONTINUED | OUTPATIENT
Start: 2022-07-31 | End: 2022-07-31 | Stop reason: HOSPADM

## 2022-07-31 RX ORDER — ACETAZOLAMIDE 500 MG/1
500 CAPSULE, EXTENDED RELEASE ORAL EVERY 12 HOURS SCHEDULED
Status: DISCONTINUED | OUTPATIENT
Start: 2022-07-31 | End: 2022-07-31 | Stop reason: HOSPADM

## 2022-07-31 RX ORDER — HYDROMORPHONE HCL IN WATER/PF 6 MG/30 ML
0.2 PATIENT CONTROLLED ANALGESIA SYRINGE INTRAVENOUS EVERY 5 MIN PRN
Status: DISCONTINUED | OUTPATIENT
Start: 2022-07-31 | End: 2022-07-31 | Stop reason: HOSPADM

## 2022-07-31 RX ORDER — BALANCED SALT SOLUTION 6.4; .75; .48; .3; 3.9; 1.7 MG/ML; MG/ML; MG/ML; MG/ML; MG/ML; MG/ML
SOLUTION OPHTHALMIC PRN
Status: DISCONTINUED | OUTPATIENT
Start: 2022-07-31 | End: 2022-07-31 | Stop reason: HOSPADM

## 2022-07-31 RX ORDER — MEPERIDINE HYDROCHLORIDE 25 MG/ML
12.5 INJECTION INTRAMUSCULAR; INTRAVENOUS; SUBCUTANEOUS
Status: DISCONTINUED | OUTPATIENT
Start: 2022-07-31 | End: 2022-07-31 | Stop reason: HOSPADM

## 2022-07-31 RX ORDER — ONDANSETRON 2 MG/ML
4 INJECTION INTRAMUSCULAR; INTRAVENOUS EVERY 30 MIN PRN
Status: DISCONTINUED | OUTPATIENT
Start: 2022-07-31 | End: 2022-07-31 | Stop reason: HOSPADM

## 2022-07-31 RX ORDER — NEOMYCIN SULFATE, POLYMYXIN B SULFATE, AND DEXAMETHASONE 3.5; 10000; 1 MG/G; [USP'U]/G; MG/G
OINTMENT OPHTHALMIC PRN
Status: DISCONTINUED | OUTPATIENT
Start: 2022-07-31 | End: 2022-07-31 | Stop reason: HOSPADM

## 2022-07-31 RX ORDER — SODIUM CHLORIDE 9 MG/ML
INJECTION, SOLUTION INTRAVENOUS CONTINUOUS
Status: DISCONTINUED | OUTPATIENT
Start: 2022-07-31 | End: 2022-07-31 | Stop reason: HOSPADM

## 2022-07-31 RX ORDER — SODIUM CHLORIDE, SODIUM LACTATE, POTASSIUM CHLORIDE, CALCIUM CHLORIDE 600; 310; 30; 20 MG/100ML; MG/100ML; MG/100ML; MG/100ML
INJECTION, SOLUTION INTRAVENOUS CONTINUOUS
Status: DISCONTINUED | OUTPATIENT
Start: 2022-07-31 | End: 2022-07-31 | Stop reason: HOSPADM

## 2022-07-31 RX ORDER — FENTANYL CITRATE 50 UG/ML
25 INJECTION, SOLUTION INTRAMUSCULAR; INTRAVENOUS
Status: DISCONTINUED | OUTPATIENT
Start: 2022-07-31 | End: 2022-07-31 | Stop reason: HOSPADM

## 2022-07-31 RX ORDER — ONDANSETRON 2 MG/ML
INJECTION INTRAMUSCULAR; INTRAVENOUS PRN
Status: DISCONTINUED | OUTPATIENT
Start: 2022-07-31 | End: 2022-07-31

## 2022-07-31 RX ORDER — NEOMYCIN SULFATE, POLYMYXIN B SULFATE AND DEXAMETHASONE 3.5; 10000; 1 MG/ML; [USP'U]/ML; MG/ML
1 SUSPENSION/ DROPS OPHTHALMIC 4 TIMES DAILY
Qty: 5 ML | Refills: 1 | Status: SHIPPED | OUTPATIENT
Start: 2022-07-31 | End: 2022-11-29

## 2022-07-31 RX ORDER — LIDOCAINE HYDROCHLORIDE 20 MG/ML
INJECTION, SOLUTION INFILTRATION; PERINEURAL PRN
Status: DISCONTINUED | OUTPATIENT
Start: 2022-07-31 | End: 2022-07-31

## 2022-07-31 RX ORDER — CYCLOPENTOLAT/TROPIC/PHENYLEPH 1%-1%-2.5%
1 DROPS (EA) OPHTHALMIC (EYE)
Status: COMPLETED | OUTPATIENT
Start: 2022-07-31 | End: 2022-07-31

## 2022-07-31 RX ORDER — FENTANYL CITRATE 50 UG/ML
25 INJECTION, SOLUTION INTRAMUSCULAR; INTRAVENOUS EVERY 5 MIN PRN
Status: DISCONTINUED | OUTPATIENT
Start: 2022-07-31 | End: 2022-07-31 | Stop reason: HOSPADM

## 2022-07-31 RX ORDER — DEXAMETHASONE SODIUM PHOSPHATE 4 MG/ML
INJECTION, SOLUTION INTRA-ARTICULAR; INTRALESIONAL; INTRAMUSCULAR; INTRAVENOUS; SOFT TISSUE PRN
Status: DISCONTINUED | OUTPATIENT
Start: 2022-07-31 | End: 2022-07-31

## 2022-07-31 RX ORDER — ONDANSETRON 4 MG/1
4 TABLET, ORALLY DISINTEGRATING ORAL EVERY 30 MIN PRN
Status: DISCONTINUED | OUTPATIENT
Start: 2022-07-31 | End: 2022-07-31 | Stop reason: HOSPADM

## 2022-07-31 RX ORDER — ATROPINE SULFATE 10 MG/ML
SOLUTION/ DROPS OPHTHALMIC PRN
Status: DISCONTINUED | OUTPATIENT
Start: 2022-07-31 | End: 2022-07-31 | Stop reason: HOSPADM

## 2022-07-31 RX ORDER — PROPOFOL 10 MG/ML
INJECTION, EMULSION INTRAVENOUS PRN
Status: DISCONTINUED | OUTPATIENT
Start: 2022-07-31 | End: 2022-07-31

## 2022-07-31 RX ORDER — FENTANYL CITRATE 50 UG/ML
INJECTION, SOLUTION INTRAMUSCULAR; INTRAVENOUS PRN
Status: DISCONTINUED | OUTPATIENT
Start: 2022-07-31 | End: 2022-07-31

## 2022-07-31 RX ORDER — OXYCODONE HYDROCHLORIDE 5 MG/1
5 TABLET ORAL EVERY 4 HOURS PRN
Status: DISCONTINUED | OUTPATIENT
Start: 2022-07-31 | End: 2022-07-31 | Stop reason: HOSPADM

## 2022-07-31 RX ORDER — ACETAZOLAMIDE 500 MG/1
500 CAPSULE, EXTENDED RELEASE ORAL ONCE
Status: COMPLETED | OUTPATIENT
Start: 2022-07-31 | End: 2022-07-31

## 2022-07-31 RX ORDER — HYDROMORPHONE HYDROCHLORIDE 1 MG/ML
0.2 INJECTION, SOLUTION INTRAMUSCULAR; INTRAVENOUS; SUBCUTANEOUS EVERY 5 MIN PRN
Status: DISCONTINUED | OUTPATIENT
Start: 2022-07-31 | End: 2022-07-31 | Stop reason: HOSPADM

## 2022-07-31 RX ORDER — PROPARACAINE HYDROCHLORIDE 5 MG/ML
1 SOLUTION/ DROPS OPHTHALMIC ONCE
Status: COMPLETED | OUTPATIENT
Start: 2022-07-31 | End: 2022-07-31

## 2022-07-31 RX ORDER — KETOROLAC TROMETHAMINE 30 MG/ML
INJECTION, SOLUTION INTRAMUSCULAR; INTRAVENOUS PRN
Status: DISCONTINUED | OUTPATIENT
Start: 2022-07-31 | End: 2022-07-31

## 2022-07-31 RX ORDER — TIMOLOL MALEATE 5 MG/ML
SOLUTION/ DROPS OPHTHALMIC PRN
Status: DISCONTINUED | OUTPATIENT
Start: 2022-07-31 | End: 2022-07-31 | Stop reason: HOSPADM

## 2022-07-31 RX ORDER — POLYMYXIN B SULFATE AND TRIMETHOPRIM 1; 10000 MG/ML; [USP'U]/ML
SOLUTION OPHTHALMIC PRN
Status: DISCONTINUED | OUTPATIENT
Start: 2022-07-31 | End: 2022-07-31 | Stop reason: HOSPADM

## 2022-07-31 RX ORDER — ACETAZOLAMIDE 500 MG/1
500 CAPSULE, EXTENDED RELEASE ORAL EVERY 12 HOURS
Qty: 1 CAPSULE | Refills: 0 | Status: SHIPPED | OUTPATIENT
Start: 2022-07-31 | End: 2022-09-01

## 2022-07-31 RX ADMIN — PROPOFOL 200 MG: 10 INJECTION, EMULSION INTRAVENOUS at 09:34

## 2022-07-31 RX ADMIN — SODIUM CHLORIDE, POTASSIUM CHLORIDE, SODIUM LACTATE AND CALCIUM CHLORIDE: 600; 310; 30; 20 INJECTION, SOLUTION INTRAVENOUS at 09:28

## 2022-07-31 RX ADMIN — MIDAZOLAM 1 MG: 1 INJECTION INTRAMUSCULAR; INTRAVENOUS at 09:24

## 2022-07-31 RX ADMIN — KETOROLAC TROMETHAMINE 15 MG: 30 INJECTION, SOLUTION INTRAMUSCULAR at 12:39

## 2022-07-31 RX ADMIN — PROPARACAINE HYDROCHLORIDE 1 DROP: 5 SOLUTION/ DROPS OPHTHALMIC at 08:30

## 2022-07-31 RX ADMIN — FENTANYL CITRATE 25 MCG: 50 INJECTION, SOLUTION INTRAMUSCULAR; INTRAVENOUS at 09:51

## 2022-07-31 RX ADMIN — LIDOCAINE HYDROCHLORIDE 100 MG: 20 INJECTION, SOLUTION INFILTRATION; PERINEURAL at 09:34

## 2022-07-31 RX ADMIN — Medication 1 DROP: at 08:42

## 2022-07-31 RX ADMIN — ACETAZOLAMIDE 500 MG: 500 CAPSULE, EXTENDED RELEASE ORAL at 14:30

## 2022-07-31 RX ADMIN — ONDANSETRON 4 MG: 2 INJECTION INTRAMUSCULAR; INTRAVENOUS at 12:31

## 2022-07-31 RX ADMIN — Medication 1 DROP: at 09:10

## 2022-07-31 RX ADMIN — DEXAMETHASONE SODIUM PHOSPHATE 10 MG: 4 INJECTION, SOLUTION INTRAMUSCULAR; INTRAVENOUS at 09:39

## 2022-07-31 RX ADMIN — FENTANYL CITRATE 25 MCG: 50 INJECTION, SOLUTION INTRAMUSCULAR; INTRAVENOUS at 10:20

## 2022-07-31 RX ADMIN — PHENYLEPHRINE HYDROCHLORIDE 100 MCG: 10 INJECTION INTRAVENOUS at 09:36

## 2022-07-31 RX ADMIN — Medication 1 DROP: at 08:31

## 2022-07-31 RX ADMIN — HYDROMORPHONE HYDROCHLORIDE 0.5 MG: 1 INJECTION, SOLUTION INTRAMUSCULAR; INTRAVENOUS; SUBCUTANEOUS at 10:37

## 2022-07-31 RX ADMIN — SODIUM CHLORIDE: 9 INJECTION, SOLUTION INTRAVENOUS at 02:40

## 2022-07-31 RX ADMIN — FENTANYL CITRATE 50 MCG: 50 INJECTION, SOLUTION INTRAMUSCULAR; INTRAVENOUS at 09:34

## 2022-07-31 ASSESSMENT — ENCOUNTER SYMPTOMS
EYE REDNESS: 0
EYE DISCHARGE: 0
COUGH: 0
DYSRHYTHMIAS: 1
BACK PAIN: 0
SHORTNESS OF BREATH: 0
DIARRHEA: 0
EYE ITCHING: 0
ABDOMINAL PAIN: 0
CONFUSION: 0
CONSTIPATION: 0
NAUSEA: 0
FEVER: 0
HEADACHES: 0
DIFFICULTY URINATING: 0
EYE PAIN: 0

## 2022-07-31 ASSESSMENT — VISUAL ACUITY
OS: 20/200
OD: 20/20

## 2022-07-31 NOTE — ED PROVIDER NOTES
ED Provider Note  [facility]      History     Chief Complaint   Patient presents with     Eye Problem     HPI  Rashawn Alonso is a 57 year old male who presents emergency department after being referred here by ophthalmology for onset of left lower visual field deficit after having worsening floaters over the past 5 to 6 days.  He reports having cobwebs in his visual field as well as a reddish tent on the drive over here.  He denies any prior ophthalmologic surgery, wears reading glasses but otherwise no contact lenses or other corrective lenses.  He denies any foreign body sensation, trauma.  He denies any headache, temporal pain, history of diabetes.  He has a history of prior A. fib, status post ablation, only on baby aspirin and not anticoagulated.  He is otherwise in his usual state of health.    Past Medical History  Past Medical History:   Diagnosis Date     A-fib (H)     ablation     Cardiomyopathy, secondary (H)     to arrhythmia-resolved w/ normal cardiac echo 2013     H/O cardiac radiofrequency ablation 01/01/2000     Hypercholesteremia      Mitral insufficiency      S/P pulmonary vein repair 01/01/2010    repeat ablation     S/P pulmonary vein repair 01/01/2011     Past Surgical History:   Procedure Laterality Date     COLONOSCOPY       COLONOSCOPY N/A 6/21/2017    Procedure: COLONOSCOPY;  colonoscopy ;  Surgeon: Brian Mistry MD;  Location: [facility]     EP ABLATION / EP STUDIES      for afib     HERNIA REPAIR       HERNIA REPAIR, UMBILICAL       OTHER SURGICAL HISTORY      BILATERAL INGUINAL HERNIORRHAPY     RADIOFREQUENCY ABLATION      X 2     VASECTOMY       atorvastatin (LIPITOR) 10 MG tablet  DOXYCYCLINE HYCLATE PO  amoxicillin (AMOXIL) 500 MG tablet  ASPIRIN PO  multivitamin, therapeutic with minerals (MULTI-VITAMIN) TABS tablet  Vitamin D, Cholecalciferol, 25 MCG (1000 UT) CAPS      No Known Allergies  Family History  Family History   Problem Relation Age of Onset      Hypertension Mother      Cerebrovascular Disease Mother      Atrial fibrillation Mother      Kidney Cancer Mother      Heart Disease Father      Other - See Comments Father         bypass surgery     Diverticulitis Father      Breast Cancer Sister      Cancer Sister      Breast Cancer Sister      Leukemia Sister         Acute     Ovarian Cancer Other         Family     Social History   Social History     Tobacco Use     Smoking status: Never Smoker     Smokeless tobacco: Never Used   Substance Use Topics     Drug use: Not Currently      Past medical history, past surgical history, medications, allergies, family history, and social history were reviewed with the patient. No additional pertinent items.       Review of Systems   Constitutional: Negative for fever.   HENT: Negative for congestion.    Eyes: Positive for visual disturbance. Negative for pain, discharge, redness and itching.   Respiratory: Negative for cough and shortness of breath.    Cardiovascular: Negative for chest pain.   Gastrointestinal: Negative for abdominal pain, constipation, diarrhea and nausea.   Genitourinary: Negative for difficulty urinating.   Musculoskeletal: Negative for back pain.   Skin: Negative for rash.   Neurological: Negative for headaches.   Psychiatric/Behavioral: Negative for confusion.     A complete review of systems was performed with pertinent positives and negatives noted in the HPI, and all other systems negative.    Physical Exam   BP: (!) 159/95  Pulse: 62  Temp: 98.3  F (36.8  C)  Resp: 16  Weight: 90.6 kg (199 lb 12.8 oz)  SpO2: 97 %  Physical Exam  Constitutional:       General: He is awake. He is not in acute distress.     Appearance: Normal appearance. He is well-developed. He is not ill-appearing or toxic-appearing.   HENT:      Head: Atraumatic.   Eyes:      General: Lids are normal. No scleral icterus.     Extraocular Movements: Extraocular movements intact.      Conjunctiva/sclera: Conjunctivae normal.       Pupils: Pupils are equal, round, and reactive to light.      Comments: Decreased visual field particularly in the left eye visual field at the inferior and nasal aspect   Cardiovascular:      Rate and Rhythm: Normal rate.   Pulmonary:      Effort: Pulmonary effort is normal. No respiratory distress.   Abdominal:      General: Bowel sounds are normal.      Palpations: Abdomen is soft.      Tenderness: There is no abdominal tenderness.   Musculoskeletal:         General: No tenderness.   Skin:     General: Skin is warm.      Findings: No rash.   Neurological:      Mental Status: He is alert and oriented to person, place, and time.   Psychiatric:         Mood and Affect: Mood normal.         Speech: Speech normal.         Behavior: Behavior is cooperative.         ED Course      Procedures                     No results found for any visits on 07/31/22.  Medications   sodium chloride 0.9% infusion (has no administration in time range)        Assessments & Plan (with Medical Decision Making)   Rashawn Alonso is a 57 year old male who presents emergency department after being referred here by ophthalmology for onset of left lower visual field deficit after having worsening floaters over the past 5 to 6 days.  Concerning for retinal detachment versus vitreous hemorrhage particularly given the reddish tent he described versus vitreous detachment.  Ophthalmology is already aware, will come and evaluate the patient.    I have reviewed the nursing notes. I have reviewed the findings, diagnosis, plan and need for follow up with the patient.    Ophthalmology evaluated the patient and are concerned for a retinal detachment with the macula on.  Awaiting final recommendations, I am concerned that the patient may require surgery on a more emergent basis.  We will obtain COVID swab here in the meantime.  Patient signed out to oncoming physician with plan to follow-up formal recommendations from ophthalmology.  New Prescriptions    No  medications on file       Final diagnoses:   Left retinal detachment       --  Waylon Alvarez MD PhD  [facility] EMERGENCY DEPARTMENT  7/30/2022     Timo Alvarez MD  07/31/22 0153

## 2022-07-31 NOTE — ANESTHESIA POSTPROCEDURE EVALUATION
Patient: Rashawn Alonso    Procedure: Procedure(s):  VITRECTOMY, PARS PLANA APPROACH, USING 25-GAUGE INSTRUMENTS, Scleral Buckle, Endolaser, Gas  Right Eye laser diode retina       Anesthesia Type:  General    Note:  Disposition: Outpatient   Postop Pain Control: Uneventful            Sign Out: Well controlled pain   PONV: No   Neuro/Psych: Uneventful            Sign Out: Acceptable/Baseline neuro status   Airway/Respiratory: Uneventful            Sign Out: Acceptable/Baseline resp. status   CV/Hemodynamics: Uneventful            Sign Out: Acceptable CV status; No obvious hypovolemia; No obvious fluid overload   Other NRE:    DID A NON-ROUTINE EVENT OCCUR?            Last vitals:  Vitals Value Taken Time   /94 07/31/22 1330   Temp 37.4  C (99.3  F) 07/31/22 1307   Pulse 80 07/31/22 1345   Resp     SpO2 93 % 07/31/22 1345   Vitals shown include unvalidated device data.    Electronically Signed By: Yashira Johnson MD  July 31, 2022  2:18 PM

## 2022-07-31 NOTE — ANESTHESIA PREPROCEDURE EVALUATION
Anesthesia Pre-Procedure Evaluation    Patient: Rashawn Alonso   MRN: [id number] : 1965        Procedure : Procedure(s):  VITRECTOMY, PARS PLANA APPROACH, USING 25-GAUGE INSTRUMENTS          Past Medical History:   Diagnosis Date     A-fib (H)     ablation     Cardiomyopathy, secondary (H)     to arrhythmia-resolved w/ normal cardiac echo      H/O cardiac radiofrequency ablation 2000     Hypercholesteremia      Mitral insufficiency      S/P pulmonary vein repair 2010    repeat ablation     S/P pulmonary vein repair 2011      Past Surgical History:   Procedure Laterality Date     COLONOSCOPY       COLONOSCOPY N/A 2017    Procedure: COLONOSCOPY;  colonoscopy ;  Surgeon: Brian Mistry MD;  Location: [facility]     EP ABLATION / EP STUDIES      for afib     HERNIA REPAIR       HERNIA REPAIR, UMBILICAL       OTHER SURGICAL HISTORY      BILATERAL INGUINAL HERNIORRHAPY     RADIOFREQUENCY ABLATION      X 2     VASECTOMY        No Known Allergies   Social History     Tobacco Use     Smoking status: Never Smoker     Smokeless tobacco: Never Used   Substance Use Topics     Alcohol use: Not on file     Comment: social      Wt Readings from Last 1 Encounters:   22 90.6 kg (199 lb 12.8 oz)        Anesthesia Evaluation   Pt has had prior anesthetic. Type: General.        ROS/MED HX  ENT/Pulmonary:       Neurologic:       Cardiovascular:     (+) Dyslipidemia -----dysrhythmias, a-fib,     METS/Exercise Tolerance:     Hematologic:       Musculoskeletal:       GI/Hepatic:       Renal/Genitourinary:       Endo:       Psychiatric/Substance Use:       Infectious Disease:       Malignancy:       Other:            Physical Exam    Airway        Mallampati: II   TM distance: > 3 FB   Neck ROM: full   Mouth opening: > 3 cm    Respiratory Devices and Support         Dental  no notable dental history         Cardiovascular   cardiovascular exam normal          Pulmonary   pulmonary exam normal                 OUTSIDE LABS:  CBC:   Lab Results   Component Value Date    WBC 5.7 05/23/2018    WBC 7.3 04/16/2009    HGB 15.0 05/23/2018    HGB 14.8 04/16/2009    HCT 44.8 05/23/2018    HCT 42.2 04/16/2009     05/23/2018     04/16/2009     BMP:   Lab Results   Component Value Date     05/23/2018    POTASSIUM 4.4 05/23/2018    CHLORIDE 106 05/23/2018    CO2 23 05/23/2018    BUN 13 05/23/2018    CR 0.75 05/23/2018    GLC 81 05/23/2018     COAGS:   Lab Results   Component Value Date    PTT 24 04/16/2009    INR 1.06 04/16/2009     POC: No results found for: BGM, HCG, HCGS  HEPATIC:   Lab Results   Component Value Date    ALBUMIN 4.2 09/30/2021    PROTTOTAL 7.7 09/30/2021    ALT 39 09/30/2021    AST 38 09/30/2021    ALKPHOS 80 09/30/2021    BILITOTAL 1.2 (H) 09/30/2021     OTHER:   Lab Results   Component Value Date    ANNELISE 9.5 05/23/2018    TSH 4.73 05/23/2018    SED 9 05/23/2018       Anesthesia Plan    ASA Status:  2   NPO Status:  NPO Appropriate    Anesthesia Type: General.     - Airway: LMA   Induction: Intravenous.   Maintenance: Balanced.        Consents    Anesthesia Plan(s) and associated risks, benefits, and realistic alternatives discussed. Questions answered and patient/representative(s) expressed understanding.    - Discussed:     - Discussed with:  Patient      - Extended Intubation/Ventilatory Support Discussed: No.      - Patient is DNR/DNI Status: No    Use of blood products discussed: No .     Postoperative Care    Pain management: Multi-modal analgesia.   PONV prophylaxis: Ondansetron (or other 5HT-3)     Comments:    Other Comments: GA with LMA, procedure Estimated length is greater than 2.5 hours and doing both eyes.            Yashira Johnson MD

## 2022-07-31 NOTE — ED TRIAGE NOTES
Patient presents to ED with fogginess to L eye. Patient reports sxs onset 1 week ago with floaters. Patient reports half of visual field is now foggy. Mild pressure noted to L eye. Patient got in touch with ophthalmology tonight and advised he come in to be seen.       Triage Assessment     Row Name 07/31/22 0007       Triage Assessment (Adult)    Airway WDL WDL       Respiratory WDL    Respiratory WDL WDL       Skin Circulation/Temperature WDL    Skin Circulation/Temperature WDL WDL       Cardiac WDL    Cardiac WDL WDL       Peripheral/Neurovascular WDL    Peripheral Neurovascular WDL WDL       Cognitive/Neuro/Behavioral WDL    Cognitive/Neuro/Behavioral WDL WDL       Steph Coma Scale    Best Eye Response 4-->(E4) spontaneous    Best Motor Response 6-->(M6) obeys commands    Best Verbal Response 5-->(V5) oriented    Manning Coma Scale Score 15

## 2022-07-31 NOTE — TELEPHONE ENCOUNTER
Telephone Note    I was contacted by the patient via paging system on 7/30/22. The following information was obtained via phone call with this patient.      Patient is a 57-year-old male who has been experiencing worsening floaters for the past 5-6 days and new onset shadow in his vision. Patient states he noticed a few floaters in his left eye 5-6 days ago that have progressively been increasing in number. Denies any flashes. Woke up this morning and noticed a shadow/cloudy area in the RLQ of his left eye that has progressed to full cloud over his inferior visual field. Denies any recent injury to the eye.    Patient's past ocular history:   Chronic blepharitis, on doxycycline    Patient's past medical history:   No history of diabetes, HTN    Given the reported symptoms, I emphasized the importance of ophthalmology evaluation and I offered to see the patient in the ED tonight. Patient agreed to come to [facility] ED tonight for ophthalmology evaluation.     Patient discussed with senior resident Dr. Mindy Thompson.    Mirian Valente MD  Ophthalmology Resident, PGY-2

## 2022-07-31 NOTE — OP NOTE
PRE-OP Dx:   1) Rhegmatogenous retinal detachment, left eye   2) Lattice degeneration, right eye    Post-OP Dx:   1) same    Attending:   Margot Hagan MD  Fellow:  Sai Trujillo MD  Resident: Mindy Thompson MD    Anesthesia:   General    Procedure: (all left eye)    1) Pars plana vitrectomy (PPV) 25g     2) Scleral buckle, #4050 band with #3084 sleeve    3) FGx 20% SF6    4) Endolaser    5) Retinotomy    Procedure (right eye:    1) laser retinopexy    EBL:   Scant    Findings:  1) Left retinal detachment, Multiple areas of lattice degeneration, large tear, left eye    2) Multiple areas of lattice degeneration, right eye    Procedure Description:    Rashawn Alonso is a 57 year old patient with a history of retinal detachment.  After informed consent was obtained, the patient was brought into the OR where general anesthesia was administered.      Laser retinopexy was performed to barricade all areas of lattice in the right eye.     The left eye was then prepped and draped in the usual fashion for ophthalmic surgery.    A 360 degree conjunctival peritomy was created and the quadrants cleared with the acuna scissors.  The muscles were isolated and looped with 2-0 silk sutures.  A silicone band was then threaded under the muscles and secured in place with mersilene mattress sutures and secured with a sleeve in the superonasal quadrant. All mattress sutures were tied except for the one over the sleeve.    Attention was then turned to the vitrectomy.  Marks were made on the sclera inferotemporally, superotemporally, and superonasally 3.5 mm posterior to the limbus.  The 25g transscleral cannulas were inserted through the sclera using the trocars.  The infusion cannula was connected to the inferonasal cannula and directly visualized to verify it was in the correct location.  The microscope was then brought into place providing a view of the retina.     It was observed that there was a retinal detachment  supratemporally associated with a linear break near some lattice at 1. There were multiple areas of lattice peripherally, all well supported by the scleral buckle.     A core vitrectomy was performed and the vitreous was stained with kenalog.  The periphery was trimmed with depression.Traction was removed and all breaks were marked with diathermy.      A retinotomy was created supratemporally posteriorly.     Endolaser was placed around all lattice.    Next a fluid air exchange was done with the soft-tipped cannula and the light pipe.  Additional endolaser was placed around the break, retinotomy and the superior areas of lattice.     The final buckle sutures were tied and the ends trimmed.  The buckle height was verified to be appropriate.      An air-gas exchange was done with SF6 and the cannulas were removed.  The sclerotomies were sutured as needed and were tight.  The pressure was checked and verified to be appropriate.      The buckle was rinsed with polytrim solution and the silk sutures removed.      The conjunctiva was closed with 6-0 plain suture.  Drops of atropine, timolol, and brimonidine were placed in the eye with Maxitrol ointment.  A pad and rodriguez shield were taped over the eye.    The patient left the OR without any complication.    Sai Trujillo MD  Retina Fellow, PGY5  Department of Ophthalmology  [facility]  Pager: [phone]      The surgery was assisted by Dr. Sai Trujillo. Due to the delicate and complex nature of this surgery, an assistant was required. He assisted with vitrectomy. I was present for the entire surgery.

## 2022-07-31 NOTE — BRIEF OP NOTE
[facility]    Brief Operative Note    Pre-operative diagnosis: Left retinal detachment [H33.22]  Post-operative diagnosis Same as pre-operative diagnosis    Procedure: Procedure(s):  VITRECTOMY, PARS PLANA APPROACH, USING 25-GAUGE INSTRUMENTS, Scleral Buckle, Endolaser, Gas  Right Eye laser diode retina  Surgeon: Surgeon(s) and Role:     * Margot Hagan MD - Primary     * Sai Trujillo MD - Fellow - Assisting     * Avery Corley MD - Fellow - Assisting  Anesthesia: General   Estimated Blood Loss: Minimal    Drains: None  Specimens: * No specimens in log *  Findings:   None.  Complications: None.  Implants:   Implant Name Type Inv. Item Serial No.  Lot No. LRB No. Used Action   EYE IMP FASTENER SLEEVE OVAL  - [id number] Lens/Eye Implant EYE IMP FASTENER SLEEVE OVAL   [place] [employer] [id number] Left 1 Implanted   EYE IMP STRIP MANJEET [id number]  - [id number] Lens/Eye Implant EYE IMP STRIP MANJEET [id number]   [place] [employer] [id number] Left 1 Implanted

## 2022-07-31 NOTE — ANESTHESIA PROCEDURE NOTES
Airway       Patient location during procedure: OR  Staff -        CRNA: Maninder Kruse APRN CRNA       Performed By: CRNA  Consent for Airway        Urgency: elective  Indications and Patient Condition       Indications for airway management: chiki-procedural       Induction type:inhalational       Mask difficulty assessment: 0 - not attempted    Final Airway Details       Final airway type: supraglottic airway    Supraglottic Airway Details        Type: LMA       Brand: Air-Q       LMA size: 4.5    Post intubation assessment        Placement verified by: capnometry        Number of attempts at approach: 1       Number of other approaches attempted: 0       Secured with: silk tape       Ease of procedure: easy       Dentition: Intact and Unchanged

## 2022-07-31 NOTE — ANESTHESIA CARE TRANSFER NOTE
Patient: Rashawn Alonso    Procedure: Procedure(s):  VITRECTOMY, PARS PLANA APPROACH, USING 25-GAUGE INSTRUMENTS, Scleral Buckle, Endolaser, Gas  Right Eye laser diode retina       Diagnosis: Left retinal detachment [H33.22]  Diagnosis Additional Information: No value filed.    Anesthesia Type:   General     Note:    Oropharynx: oropharynx clear of all foreign objects, spontaneously breathing and oral airway in place  Level of Consciousness: drowsy  Oxygen Supplementation: face mask  Level of Supplemental Oxygen (L/min / FiO2): 6  Independent Airway: airway patency satisfactory and stable  Dentition: dentition unchanged  Vital Signs Stable: post-procedure vital signs reviewed and stable  Report to RN Given: handoff report given  Patient transferred to: PACU    Handoff Report: Identifed the Patient, Identified the Reponsible Provider, Reviewed the pertinent medical history, Discussed the surgical course, Reviewed Intra-OP anesthesia mangement and issues during anesthesia, Set expectations for post-procedure period and Allowed opportunity for questions and acknowledgement of understanding      Vitals:  Vitals Value Taken Time   /89    Temp 37.1    Pulse 82    Resp 20    SpO2 96%        Electronically Signed By: LIZZ So CRNA  July 31, 2022  12:59 PM

## 2022-07-31 NOTE — CONSULTS
"  OPHTHALMOLOGY CONSULT NOTE  07/31/22    Patient: Rashawn Alonso  Consulted by: Timo Alvarez MD  Reason for Consult: Concern for retinal detachment    HISTORY OF PRESENTING ILLNESS:     Rashawn Alonso is a 57 year old male who has been experiencing worsening floaters for the past 5-6 days and new onset shadow in his vision. Patient states he noticed a few floaters in his left eye 5-6 days ago that have progressively been increasing in number up to 20-30 floaters. Denies any flashes but has noticed some different color sensations and wispy \"cobwebs\" in his left eye. Woke up this morning and noticed a shadow/cloudy area in the RLQ of his left eye that has progressed to full cloud over his inferior and nasal visual field. Denies any recent injury to the eye, pain, redness, swelling, diplopia. No numbness, tingling, weakness, headaches, fevers, chills, arthralgias. Does remember having a FBS in the left eye about one week ago but it resolved on its own.    Last time eating or drinking was around 7pm - he had a few slices of pizza and a bottle of water.    Review of systems were otherwise negative except for that which has been stated above.      OCULAR/MEDICAL/SURGICAL HISTORIES:     Past Ocular History:  - Chronic blepharitis, on doxycycline  Last eye exam: ~1 year ago, has one scheduled with regular eye doctor next week. Follows with [facility].   Prior eye surgery/laser: None  Contact lens wear: States he used to wear contacts for myopia but no longer needs them.  Glasses: Readers.   Eyedrops: None    Family History:  No family history of ocular disorders    Social History:  Wife here with patient. Lives in [place].    Medical History:  - HLD  - Afib s/p ablation  - No history of diabetes or HTN    Past Medical History:   Diagnosis Date     A-fib (H)     ablation     Cardiomyopathy, secondary (H)     to arrhythmia-resolved w/ normal cardiac echo 2013     H/O cardiac radiofrequency ablation 01/01/2000     " Hypercholesteremia      Mitral insufficiency      S/P pulmonary vein repair 01/01/2010    repeat ablation     S/P pulmonary vein repair 01/01/2011       Past Surgical History:   Procedure Laterality Date     COLONOSCOPY       COLONOSCOPY N/A 6/21/2017    Procedure: COLONOSCOPY;  colonoscopy ;  Surgeon: Brian Mistry MD;  Location: [facility]     EP ABLATION / EP STUDIES      for afib     HERNIA REPAIR       HERNIA REPAIR, UMBILICAL       OTHER SURGICAL HISTORY      BILATERAL INGUINAL HERNIORRHAPY     RADIOFREQUENCY ABLATION      X 2     VASECTOMY         EXAMINATION:     Base Eye Exam     Visual Acuity       Right Left    Near cc 20/20 20/25+2   Patient had to hold card up and to his left to avoid shadow in the left eye.           Tonometry (Tonopen, 12:43 AM)       Right Left    Pressure 13 11          Pupils       Dark Light Shape React APD    Right 4 2 Round Brisk None    Left 4 2 Round Brisk Present          Visual Fields       Left Right      Full    Restrictions Partial outer superior nasal, inferior nasal deficiencies           Extraocular Movement       Right Left     Full, Ortho Full, Ortho          Dilation     Both eyes: 1.0% Mydriacyl, 2.5% Blade Synephrine @ 12:45 AM            Slit Lamp and Fundus Exam     External Exam       Right Left    External Normal Normal          Slit Lamp Exam       Right Left    Lids/Lashes Mild blepharitis Mild blepharitis    Conjunctiva/Sclera White and quiet White and quiet    Cornea Clear, few PEE Clear, few PEE    Anterior Chamber Deep and quiet Deep and quiet    Iris Dilated Dilated    Lens Clear, phakic Clear, phakic    Vitreous PVD Normal          Fundus Exam       Right Left    Disc Normal Normal    C/D Ratio 0.3 0.3    Macula Normal Flat, attached    Vessels Normal Normal    Periphery lattice extending majority of inferior and temporal periphery Bullous retinal detachment superotemporal midperiphery extending from 12:00 to 4:00, approaching superotemporal macula.  Superotemporal small horseshoe tear 2:00. Larger irregular tear at 1:00 extending two clock hours in size. Inferotemporal midperiphery raised area suspicious for tear. Peripheral lattice.                   ASSESSMENT/PLAN:     Rashawn Alonso is a 57 year old male who presents with     # [place] Rhegmatogenous Retinal Detachment, left eye  # Retinal Tears, left eye  - Patient with worsening floaters past 5-6 days, new shadow in vision this morning.   - VA 20/25+2 in left eye. Positive APD in the left eye. Fundus exam with temporal macula-on retinal detachment from 12:00 to 4:00 approaching superotemporal macula. Both eyes with peripheral lattice. Two tears. Small horseshoe tear at 2:00, larger irregular tear at 1:00 extending two clock hours in midperiphery.   - Covid negative 07/31/22  - Last food and drink at 7:00pm 7/30/22 (few slices of pizza and a bottled water)  - Discussed findings and plan for surgery with the patient and his wife    RECOMMENDATIONS:  - NPO  - Surgical management of retinal detachment with PPV with possible scleral buckle, possible oil vs gas (boarded for 10:00am on 7/31/22)  - Recommend laying flat with left side down, try to minimize eye movement until surgery    It is our pleasure to participate in this patient's care and treatment. Please contact us with any further questions or concerns.    Patient seen and discussed with senior resident Mindy Thompson MD and discussed with retina fellow Sai Trujillo MD.    Mirian Valente MD  Ophthalmology Resident, PGY-2

## 2022-07-31 NOTE — DISCHARGE INSTRUCTIONS
POST-OPERATIVE INSTRUCTIONS FOLLOWING SURGERY    Margot Hagan MD, PhD  Department of Ophthalmology  [facility]  [phone]    FOLLOW UP:  You have a follow up appointment tomorrow at 8:30 AM at the Eye Clinic in the [facility] 9th floor:    [address] [place] [address]      EYE DROPS  Drops will be given to you after the surgery.  They DO NOT need to be used until after you are seen in clinic.  DO NOT disturb your bandage the first night.      When using more than one drop, separate them by 3 minutes between drops.  Common times to place drops are breakfast, lunch, dinner, and bedtime.  Do not stop your drops without discussing with our office.  If you run out before your appointment, call and we will send in a refill.    Maxitrol Drops  --- 4 times per day    Please take the pill of acetazolamide that we prescribed to you tomorrow morning before you leave for your appointment.       HEAD POSITIONING  You have a bubble of gas in your eye that we placed at the end of your surgery. You can do a lot to help your eye heal if you can position your head correctly. This is extremely important.    Position: FACE DOWN tonight    Maintain this positioning 50 minutes of every hour until we see you tomorrow. You will likely need to keep this positioning for at least the first 3 days.    When positioning, it is OK to take brief breaks to eat, stretch, clean up, etc.  Try to position for 90% of the time (5 minute break per hour on average).  Do not lay flat on your back until the bubble is gone.  Do not fly on an airplane or travel to elevations more than 1000 feet above [place] until the bubble is gone.  Keep the green bracelet on your wrist until the bubble is gone.  If it breaks, we can give you a replacement    Sleeping face down can be challenging.  One method is to sleep on your stomach with your head hanging over the edge of the bed with a chair there to rest your  "head on.  Alternatively, you can sleep with your chest laying on top of a large pile of pillows with your head hanging over.  Alternatively, you can place 2 rows of firm pillows side by side with a gap in between.  Sleep on top of the pillows with your head in the gap.  Alternatively, it is also possible to rent positioning equipment from medical supply stores. This typically costs $150-200 per week. Insurance usually does not cover the cost.  Often, patients prefer the pillows they set up themselves to the rented equipment.      ACTIVITY:  No heavy lifting after surgery  Keep the bandage in place until you are seen tomorrow   Do not get your bandage wet      PAIN MEDICATION   It is common to have some mild or moderate discomfort after eye surgery.  Tylenol or ibuprofen may be taken if you don't have any other general health conditions that prevent you from taking these.      WHAT TO EXPECT  It is common for the eye to to have a blood tinged discharge for a few days after surgery  It may feel irritated (as if something were in your eye), for there to be clear discharge (thicker in the mornings upon awakening), and for it to be bloodshot for 2-3 weeks following retina surgery.   Your vision is also commonly decreased during this time due to the bubble.      WHAT TO WATCH OUT FOR  If you experience any of the following \"RSVP Symptoms\", you should call immediately:  Worsening Redness  Worsening Sensitivity to light  Worsening Vision, including new flashing lights or floaters  Worsening Pain, including nausea/vomiting      For any of the symptoms listed above, or for other concerns, call [phone] and ask to speak to the clinic nurse.  If you call after hours, follow to prompts to reach the doctor on call.    Same-Day Surgery   Adult Discharge Orders & Instructions     For 24 hours after surgery:  Get plenty of rest.  A responsible adult must stay with you for at least 24 hours after you leave the hospital.   Pain " medication can slow your reflexes. Do not drive or use heavy equipment.  If you have weakness or tingling, don't drive or use heavy equipment until this feeling goes away.  Mixing alcohol and pain medication can cause dizziness and slow your breathing. It can even be fatal. Do not drink alcohol while taking pain medication.  Avoid strenuous or risky activities.  Ask for help when climbing stairs.   You may feel lightheaded.  If so, sit for a few minutes before standing.  Have someone help you get up.   If you have nausea (feel sick to your stomach), drink only clear liquids such as apple juice, ginger ale, broth or 7-Up.  Rest may also help.  Be sure to drink enough fluids.  Move to a regular diet as you feel able. Take pain medications with a small amount of solid food, such as toast or crackers, to avoid nausea.   A slight fever is normal. Call the doctor if your fever is over 100 F (37.7 C) (taken under the tongue) or lasts longer than 24 hours.  You may have a dry mouth, muscle aches, trouble sleeping or a sore throat.  These symptoms should go away after 24 hours.  Do not make important or legal decisions.   Pain Management:      1. Take pain medication (if prescribed) for pain as directed by your physician.        2. WARNING: If the pain medication you have been prescribed contains Tylenol  (acetaminophen), DO NOT take additional doses of Tylenol (acetaminophen).     Call your doctor for any of the followin.  Signs of infection (fever, growing tenderness at the surgery site, severe pain, a large amount of drainage or bleeding, foul-smelling drainage, redness, swelling).    2.  It has been over 8 to 10 hours since surgery and you are still not able to urinate (pee).    3.  Headache for over 24 hours.    4.  Numbness, tingling or weakness the day after surgery (if you had spinal anesthesia).  To contact a doctor, call [facility] at [phone]  or:  ' [phone] and ask for the Resident On  Call for:        Ophthalmology  (answered 24 hours a day)  '   Emergency Department:  [facility]: [phone]  [place] [facility]: [phone]               Rev. 10/2014

## 2022-08-01 ENCOUNTER — OFFICE VISIT (OUTPATIENT)
Dept: OPHTHALMOLOGY | Facility: CLINIC | Age: 57
End: 2022-08-01
Attending: OPHTHALMOLOGY
Payer: COMMERCIAL

## 2022-08-01 ENCOUNTER — TELEPHONE (OUTPATIENT)
Dept: OPHTHALMOLOGY | Facility: CLINIC | Age: 57
End: 2022-08-01

## 2022-08-01 DIAGNOSIS — Z51.89 AFTERCARE: Primary | ICD-10-CM

## 2022-08-01 PROCEDURE — G0463 HOSPITAL OUTPT CLINIC VISIT: HCPCS

## 2022-08-01 PROCEDURE — 99024 POSTOP FOLLOW-UP VISIT: CPT | Performed by: OPHTHALMOLOGY

## 2022-08-01 RX ORDER — NEOMYCIN POLYMYXIN B SULFATES AND DEXAMETHASONE 3.5; 10000; 1 MG/ML; [USP'U]/ML; MG/ML
1 SUSPENSION/ DROPS OPHTHALMIC 4 TIMES DAILY
Qty: 5 ML | Refills: 1 | Status: SHIPPED | OUTPATIENT
Start: 2022-08-01 | End: 2022-11-29

## 2022-08-01 ASSESSMENT — TONOMETRY
OS_IOP_MMHG: 06
IOP_METHOD: TONOPEN
OD_IOP_MMHG: 05

## 2022-08-01 ASSESSMENT — SLIT LAMP EXAM - LIDS: COMMENTS: MILD BLEPHARITIS

## 2022-08-01 ASSESSMENT — VISUAL ACUITY
OS_SC: HM
OD_SC: 20/25
METHOD: SNELLEN - LINEAR
OD_SC+: -3

## 2022-08-01 ASSESSMENT — EXTERNAL EXAM - LEFT EYE: OS_EXAM: NORMAL

## 2022-08-01 ASSESSMENT — CUP TO DISC RATIO
OS_RATIO: 0.3
OD_RATIO: 0.3

## 2022-08-01 ASSESSMENT — ENCOUNTER SYMPTOMS: JOINT SWELLING: 1

## 2022-08-01 ASSESSMENT — EXTERNAL EXAM - RIGHT EYE: OD_EXAM: NORMAL

## 2022-08-01 NOTE — PROGRESS NOTES
Postoperative day 1 status post VITRECTOMY, PARS PLANA APPROACH, USING 25-GAUGE INSTRUMENTS, Scleral Buckle, Endolaser, Gas; left eye ; indirect laser ophthalmoscopy right eye     Slept well  Retina attached  Doing well    Plan:  Position: face down x 1 day then R side down  No aviation  No heavy lifting   Whatley shield at all times  Retina detachment and endophthalmitis precautions were discussed with the patient and was asked to return if any of the those occur    Medications to operative eye  Maxitrol (pink top) four times a day    Maxitrol oint at bedtime  Atropine (red top) once a day     Follow up in one week  ~~~~~~~~~~~~~~~~~~~~~~~~~~~~~~~~~~   Complete documentation of historical and exam elements from today's encounter can be found in the full encounter summary report (not reduplicated in this progress note).  I personally obtained the chief complaint(s) and history of present illness.  I confirmed and edited as necessary the review of systems, past medical/surgical history, family history, social history, and examination findings as documented by others; and I examined the patient myself.  I personally reviewed the relevant tests, images, and reports as documented above.  I formulated and edited as necessary the assessment and plan and discussed the findings and management plan with the patient and family    Margot Hagan MD  .  Retina Service   Department of Ophthalmology and Visual Neurosciences   [facility]  Phone: [phone]   Fax: [phone]

## 2022-08-01 NOTE — PATIENT INSTRUCTIONS
Position: face down x 1 day then R side down  No aviation  No heavy lifting   Whatley shield at all times  Retina detachment and endophthalmitis precautions were discussed with the patient and was asked to return if any of the those occur    Medications to operative eye  Maxitrol (pink top) four times a day    Maxitrol oint at bedtime  Atropine (red top) once a day     Follow up in one week

## 2022-08-01 NOTE — TELEPHONE ENCOUNTER
" [facility] Call Center    Phone Message    May a detailed message be left on voicemail: yes     Reason for Call: Medication Question or concern regarding medication   Prescription Clarification  Name of Medication: neomycin-polymixin-dexamethasone (MAXITROL) 0.1 % ophthalmic suspension  Prescribing Provider: Dr. Hagan   Pharmacy: [id number] [place], [place] - [address] AT [facility] [address]   What on the order needs clarification? Pharmacy told pt's wife that insurance will not refill this medication until 8/19. She said that it needs to be sent to the pharmacy as a \"new\" Rx and not as a refill so that it will be covered. Please send ASAP as pt is post-op. Thank you.      Action Taken: Message routed to:  [facility] EYE    Travel Screening: Not Applicable                                                                        "

## 2022-08-01 NOTE — NURSING NOTE
Chief Complaints and History of Present Illnesses   Patient presents with     Post Op (Ophthalmology) Both Eyes     Post Vitrectomy, Pars plana approach, using 25 gauge instruments, Scleral Buckle, Endolaser and Gas in Left eye and laser diode retina of right eye on 07/31/2022       Chief Complaint(s) and History of Present Illness(es)     Post Op (Ophthalmology) Both Eyes     Laterality: both eyes    Associated symptoms: eye pain (left eye), tearing and headache.  Comments: (shoulder/hip pain: left eye)    Pain scale: 3/10    Comments: Post Vitrectomy, Pars plana approach, using 25 gauge instruments, Scleral Buckle, Endolaser and Gas in Left eye and laser diode retina of right eye on 07/31/2022                Comments     Patient returns to clinic for a one post operative exam of each eye.  He states that he has a slight headache and some left eye pain this morning.  Patch was removed from left eye.  Vision in his right eye has been intermittently blurred since the procedure.    Justa Heath, COT 8:34 AM  August 1, 2022

## 2022-08-02 ENCOUNTER — TELEPHONE (OUTPATIENT)
Dept: OPHTHALMOLOGY | Facility: CLINIC | Age: 57
End: 2022-08-02

## 2022-08-02 NOTE — TELEPHONE ENCOUNTER
Called mobile/home-[phone] at 0819 and left message stating Rx for antibiotic drop (suzy/poly/dex) was sent to pharmacy yesterday and provided direct number for any further assistance.    Brian Wolf RN 8:20 AM 08/02/22               [facility]    Phone Message    May a detailed message be left on voicemail: yes     Reason for Call: Other:   Post op Pt calling back about antibiotic rx. Pt states that he needs this antibiotic to prevent infection without it, he could lose his vision. Two TE was sent over yesterday request for a new rx to be sent over because the one that was originally sent over was sent as a refill. Rx was supposed to be canceled and sent again as a new rx. Clinic advised to send a TE again and clinic would call pt back in 5 min.     Action Taken: Other:  eye    Travel Screening: Not Applicable

## 2022-08-02 NOTE — TELEPHONE ENCOUNTER
Spoke to [facility] and cancelled suzy/poly/dex drop order    Updated [facility] Rx cancelled thru [facility]    [facility] states going thru insurance     Pt aware may  drop today    Brian Wolf RN 8:46 AM 08/02/22

## 2022-08-03 ENCOUNTER — TELEPHONE (OUTPATIENT)
Dept: OPHTHALMOLOGY | Facility: CLINIC | Age: 57
End: 2022-08-03

## 2022-08-03 NOTE — TELEPHONE ENCOUNTER
Reviewed with pt to keep right head/side down for one week (until next post-op visit on 8-8-2022).    Reviewed would forward to Dr. Hagan and if able to ease positioning restricting-- will call pt back    Pt verbally demonstrated understanding    Brian Wolf RN 10:34 AM 08/03/22          [facility]    Phone Message    May a detailed message be left on voicemail: yes     Reason for Call: Other: Pt would like a call back to remind him how long he needs to remain face down since his surgery on Sunday. He cannot remember. Please call to advise. Thank you.      Action Taken: Message routed to:  [facility] ([facility] EYE    Travel Screening: Not Applicable

## 2022-08-03 NOTE — TELEPHONE ENCOUNTER
Rx was cancelled thru [facility] yesterday and pt received at regular retail pharmacy yesterday    Brian Wolf RN 3:05 PM 08/03/22

## 2022-08-08 ENCOUNTER — OFFICE VISIT (OUTPATIENT)
Dept: OPHTHALMOLOGY | Facility: CLINIC | Age: 57
End: 2022-08-08
Attending: OPHTHALMOLOGY
Payer: COMMERCIAL

## 2022-08-08 DIAGNOSIS — Z98.890 POSTOPERATIVE EYE STATE: Primary | ICD-10-CM

## 2022-08-08 PROCEDURE — G0463 HOSPITAL OUTPT CLINIC VISIT: HCPCS

## 2022-08-08 PROCEDURE — 99024 POSTOP FOLLOW-UP VISIT: CPT | Mod: GC | Performed by: OPHTHALMOLOGY

## 2022-08-08 ASSESSMENT — VISUAL ACUITY
METHOD: SNELLEN - LINEAR
OS_SC: 4'/200E
OD_SC: 20/30
OS_PH_SC: 20/100
OD_PH_SC: 20/20

## 2022-08-08 ASSESSMENT — TONOMETRY
OS_IOP_MMHG: 14
OD_IOP_MMHG: 15
IOP_METHOD: TONOPEN

## 2022-08-08 ASSESSMENT — SLIT LAMP EXAM - LIDS: COMMENTS: MILD BLEPHARITIS

## 2022-08-08 ASSESSMENT — EXTERNAL EXAM - RIGHT EYE: OD_EXAM: NORMAL

## 2022-08-08 ASSESSMENT — EXTERNAL EXAM - LEFT EYE: OS_EXAM: NORMAL

## 2022-08-08 ASSESSMENT — CUP TO DISC RATIO
OD_RATIO: 0.3
OS_RATIO: 0.3

## 2022-08-08 NOTE — PROGRESS NOTES
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    Vision clearing up with resorption of SF6, about 30-40% now  Retina flat and attached  No infection  IOP good    Plan:  - Normal positioning  - No aviation  - No heavy lifting   - Whatley shield at all times  - Retina detachment and endophthalmitis precautions were discussed with the patient and was asked to return if any of the those occur    Medications to operative eye  - Continue Maxitrol (pink or white top) Three times a day x 1 week, then twice a day x 1 week and then once a day till finish  - Continue Maxitrol oint at bedtime till finish  - Stop Atropine   - sleep R side down  Follow up in 3 weeks with Optical Coherence Tomography and optos   ~~~~~~~~~~~~~~~~~~~~~~~~~~~~~~~~~~   Complete documentation of historical and exam elements from today's encounter can be found in the full encounter summary report (not reduplicated in this progress note).  I personally obtained the chief complaint(s) and history of present illness.  I confirmed and edited as necessary the review of systems, past medical/surgical history, family history, social history, and examination findings as documented by others; and I examined the patient myself.  I personally reviewed the relevant tests, images, and reports as documented above.  I formulated and edited as necessary the assessment and plan and discussed the findings and management plan with the patient and family    Margot Hagan MD  Professor Retina Service   Department of Ophthalmology and Visual Neurosciences   [facility]  Phone: [phone]   Fax: [phone]

## 2022-08-08 NOTE — NURSING NOTE
Chief Complaints and History of Present Illnesses   Patient presents with     Post Op (Ophthalmology) Both Eyes     Chief Complaint(s) and History of Present Illness(es)     Post Op (Ophthalmology) Both Eyes               Comments     status post VITRECTOMY, PARS PLANA APPROACH, USING 25-GAUGE INSTRUMENTS, Scleral Buckle, Endolaser, Gas; left eye ; indirect laser ophthalmoscopy right eye-Pt. States that he is doing well. VA seems to be improving RE. Does have occasional pressure feeling behind RE   Betsey Nobles COT 7:41 AM August 8, 2022

## 2022-08-31 DIAGNOSIS — Z98.890 POSTOPERATIVE EYE STATE: Primary | ICD-10-CM

## 2022-09-01 ENCOUNTER — OFFICE VISIT (OUTPATIENT)
Dept: OPHTHALMOLOGY | Facility: CLINIC | Age: 57
End: 2022-09-01
Attending: OPHTHALMOLOGY
Payer: COMMERCIAL

## 2022-09-01 DIAGNOSIS — Z98.890 POSTOPERATIVE EYE STATE: ICD-10-CM

## 2022-09-01 PROCEDURE — 99207 OCT RETINA SPECTRALIS OU (BOTH EYE): CPT | Mod: 26 | Performed by: STUDENT IN AN ORGANIZED HEALTH CARE EDUCATION/TRAINING PROGRAM

## 2022-09-01 PROCEDURE — 99207 FUNDUS PHOTOS OU (BOTH EYES): CPT | Mod: 26 | Performed by: STUDENT IN AN ORGANIZED HEALTH CARE EDUCATION/TRAINING PROGRAM

## 2022-09-01 PROCEDURE — G0463 HOSPITAL OUTPT CLINIC VISIT: HCPCS | Mod: 25

## 2022-09-01 PROCEDURE — 99024 POSTOP FOLLOW-UP VISIT: CPT | Mod: GC | Performed by: STUDENT IN AN ORGANIZED HEALTH CARE EDUCATION/TRAINING PROGRAM

## 2022-09-01 PROCEDURE — 92134 CPTRZ OPH DX IMG PST SGM RTA: CPT | Performed by: OPHTHALMOLOGY

## 2022-09-01 PROCEDURE — 92250 FUNDUS PHOTOGRAPHY W/I&R: CPT | Performed by: OPHTHALMOLOGY

## 2022-09-01 ASSESSMENT — CONF VISUAL FIELD
METHOD: COUNTING FINGERS
OD_NORMAL: 1
OS_NORMAL: 1

## 2022-09-01 ASSESSMENT — REFRACTION_MANIFEST
OD_CYLINDER: +0.75
OS_AXIS: 170
OS_SPHERE: -5.00
OD_AXIS: 090
OS_CYLINDER: +1.00
OD_SPHERE: -1.50

## 2022-09-01 ASSESSMENT — CUP TO DISC RATIO
OD_RATIO: 0.3
OS_RATIO: 0.3

## 2022-09-01 ASSESSMENT — VISUAL ACUITY
OD_PH_SC: 20/25
OS_PH_SC: 20/80
OD_PH_SC+: -3
METHOD: SNELLEN - LINEAR
OS_SC: 20/250
OS_PH_SC+: -1/+2
OD_SC: 20/30

## 2022-09-01 ASSESSMENT — TONOMETRY
OD_IOP_MMHG: 16
OS_IOP_MMHG: 15
IOP_METHOD: TONOPEN

## 2022-09-01 ASSESSMENT — SLIT LAMP EXAM - LIDS
COMMENTS: NORMAL
COMMENTS: NORMAL

## 2022-09-01 ASSESSMENT — EXTERNAL EXAM - RIGHT EYE: OD_EXAM: NORMAL

## 2022-09-01 ASSESSMENT — EXTERNAL EXAM - LEFT EYE: OS_EXAM: NORMAL

## 2022-09-01 NOTE — PROGRESS NOTES
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 Subretinal round hyper reflective deposits     Optos C/w exam    Assessment    # POW#5 s/p 25gPPV/SBP/EL/SF6/EL left eye and PRP right eye 07/31/2022  - Vision clear, has plateaud due to myopic shift and PSC  - MRx with large myopic shift left eye but BCVA 20/100 worse than pinhole of 20/80, likely PSC preventing improvement  - Retin flat and attached 360 both eyes  - IOP good both eyes    # PSC cataract, left eye  - visually significant  Visually significant:YES  Glare: Positive   Reports impacts ADL   Dilation:Good  Iris expansion: Not needed  Pseudoexfoliation: NO  Trypan Blue: No   Phacodonesis: No  Cornea guttae: rare  Aim for: -0.50  Start artificial tears twice a day and as needed    Anesthesia: peribulbar block on asa 81  Surgical time: 30 minutes  Candidate for multifocal: NO  Candidate for toric IOL: NO  Anticoagulants: NO  Alpha blockers/Flomax: NO    Plan:  - IOL calculations today  - Pt intends to travel and won't be able to stay in [place] area until mid November  - We reviewed the indications, risks, benefits, and alternatives of cataract surgery. The risks include infection, bleeding in the eye, retained pieces of cataract inside the eye, corneal swelling, detachment or swelling of the retina, new floaters in the vision, iris or pupil irregularities, drooping of the eyelid, glaucoma, an imbalance between the eyes and/or double vision, possible need for intraocular lens implant repositioning, exchange, or removal, or complications from anesthesia. The patient understands that these complications can result in poor vision, loss of  vision, or loss of the eye and may result in the need for additional surgery.  We reviewed the patient's visual needs at distance, needs, and intermediate, and used this information to reviewed the intraocular lens options: monofocal, toric, and multifocal lens options.     Based upon our question/answer/discussion, the best intraocular lens option (likely nontoric/monofocal) and refractive goal (di) was selected for the patient based on their stated vision needs and goals. The patient understands that the desired refractive outcome is not guaranteed, and some postoperative optical correction with spectacles or contact lenses to refine vision for distance, near, or both may be necessary.    All questions answered to patient's apparent satisfaction. Patient acknowledged understanding of the situation and desire to proceed with the surgery as outlined. Patient wishes to have eye surgical scheduling call her to arrange the appropriate appointments.    - Normal positioning  - Ok for aviation, plans to travel to [place] in end September 2022, coming back Nov14  - follow up in Nov with MARLENY and Optical Coherence Tomography and plan for Cataract extraction intraocular lens; intraocular lens calcs  - No heavy lifting   - Retina detachment and endophthalmitis precautions were discussed with the patient and was asked to return if any of the those occur    ~~~~~~~~~~~~~~~~~~~~~~~~~~~~~~~~~~  My privilege to be part of your care,  Tristian Nguyen MD, MSc  Ophthalmology PGY-3 resident physician  Pager: [phone]     Complete documentation of historical and exam elements from today's encounter can be found in the full encounter summary report (not reduplicated in this progress note).  I personally obtained the chief complaint(s) and history of present illness.  I confirmed and edited as necessary the review of systems, past medical/surgical history, family history, social history, and examination findings as documented by others;  and I examined the patient myself.  I personally reviewed the relevant tests, images, and reports as documented above.  I formulated and edited as necessary the assessment and plan and discussed the findings and management plan with the patient and family    Margot Hagan MD  Professor Retina Service   Department of Ophthalmology and Visual Neurosciences   [facility]  Phone: [phone]   Fax: [phone]

## 2022-09-01 NOTE — NURSING NOTE
Chief Complaints and History of Present Illnesses   Patient presents with     Post Op (Ophthalmology) Both Eyes     LEFT VITRECTOMY, PARS PLANA APPROACH, USING 25-GAUGE INSTRUMENTS, Scleral Buckle, Endolaser, Retinotomy, SF6 20% Gas Bubble and  Right Eye Green Diode Laser on 07/31/2022     Chief Complaint(s) and History of Present Illness(es)     Post Op (Ophthalmology) Both Eyes     Laterality: both eyes    Associated symptoms: redness (left eye), tearing and foreign body sensation (left eye, intermittent).  Negative for eye pain.  Comments: (shoulder/hip pain: left eye)    Treatments tried: eye drops and ointment    Pain scale: 0/10    Comments: LEFT VITRECTOMY, PARS PLANA APPROACH, USING 25-GAUGE INSTRUMENTS, Scleral Buckle, Endolaser, Retinotomy, SF6 20% Gas Bubble and  Right Eye Green Diode Laser on 07/31/2022              Comments     He states that his vision in the left eye is gradually improving, though seems to have leveled in the past week.  His peripheral vision in that eye seems clear.  The gas bubble has resolved.    He is doing well with his right eye.    Justa Heath, MORALES 12:30 PM  September 1, 2022

## 2022-09-16 ENCOUNTER — TELEPHONE (OUTPATIENT)
Dept: OPHTHALMOLOGY | Facility: CLINIC | Age: 57
End: 2022-09-16

## 2022-09-16 NOTE — TELEPHONE ENCOUNTER
[facility]    Phone Message    May a detailed message be left on voicemail: yes     Reason for Call: Other:   Post op Pt of [facility] states that his operative eye is currently irritated and scratchy. There 4 sample bottles that Dr Hagan gave pt at their last appt and told pt not to use it. Pt now inquiring if he can use those bottles for his eye or if he should be using normal eye drops. Please follow-up with pt asap to advise.     Action Taken: Other:  EYE    Travel Screening: Not Applicable

## 2022-11-04 DIAGNOSIS — Z98.890 POSTOPERATIVE EYE STATE: Primary | ICD-10-CM

## 2022-11-04 DIAGNOSIS — H43.393 VITREOUS SYNERESIS OF BOTH EYES: ICD-10-CM

## 2022-11-17 ENCOUNTER — OFFICE VISIT (OUTPATIENT)
Dept: OPHTHALMOLOGY | Facility: CLINIC | Age: 57
End: 2022-11-17
Attending: OPHTHALMOLOGY
Payer: COMMERCIAL

## 2022-11-17 DIAGNOSIS — H43.393 VITREOUS SYNERESIS OF BOTH EYES: ICD-10-CM

## 2022-11-17 DIAGNOSIS — H25.042 POSTERIOR SUBCAPSULAR POLAR SENILE CATARACT OF LEFT EYE: Primary | ICD-10-CM

## 2022-11-17 PROCEDURE — 76519 ECHO EXAM OF EYE: CPT | Performed by: OPHTHALMOLOGY

## 2022-11-17 PROCEDURE — 99214 OFFICE O/P EST MOD 30 MIN: CPT | Mod: GC | Performed by: OPHTHALMOLOGY

## 2022-11-17 PROCEDURE — G0463 HOSPITAL OUTPT CLINIC VISIT: HCPCS | Mod: 25

## 2022-11-17 PROCEDURE — 92134 CPTRZ OPH DX IMG PST SGM RTA: CPT | Performed by: OPHTHALMOLOGY

## 2022-11-17 ASSESSMENT — EXTERNAL EXAM - RIGHT EYE: OD_EXAM: NORMAL

## 2022-11-17 ASSESSMENT — TONOMETRY
OD_IOP_MMHG: 19
IOP_METHOD: TONOPEN
OS_IOP_MMHG: 15

## 2022-11-17 ASSESSMENT — VISUAL ACUITY
OD_SC: 20/20
OS_PH_SC: 20/125
METHOD: SNELLEN - LINEAR
OS_SC: 20/500

## 2022-11-17 ASSESSMENT — CONF VISUAL FIELD
OD_INFERIOR_NASAL_RESTRICTION: 0
OD_INFERIOR_TEMPORAL_RESTRICTION: 0
OS_INFERIOR_NASAL_RESTRICTION: 0
OD_SUPERIOR_NASAL_RESTRICTION: 0
OS_SUPERIOR_TEMPORAL_RESTRICTION: 0
OS_INFERIOR_TEMPORAL_RESTRICTION: 0
OD_SUPERIOR_TEMPORAL_RESTRICTION: 0
OD_NORMAL: 1
OS_SUPERIOR_NASAL_RESTRICTION: 0
METHOD: COUNTING FINGERS
OS_NORMAL: 1

## 2022-11-17 ASSESSMENT — SLIT LAMP EXAM - LIDS
COMMENTS: NORMAL
COMMENTS: NORMAL

## 2022-11-17 ASSESSMENT — CUP TO DISC RATIO
OD_RATIO: 0.3
OS_RATIO: 0.3

## 2022-11-17 ASSESSMENT — EXTERNAL EXAM - LEFT EYE: OS_EXAM: NORMAL

## 2022-11-17 NOTE — NURSING NOTE
Chief Complaints and History of Present Illnesses   Patient presents with     Follow Up     PSC cataract, left eye     Chief Complaint(s) and History of Present Illness(es)     Follow Up            Comments: PSC cataract, left eye          Comments    Yennifer Cabral COT 8:32 AM November 17, 2022   Occ floaters left eye since last visit  No flashes, eye pain or redness    Yennifer Cabral COT 8:32 AM November 17, 2022

## 2022-11-17 NOTE — PROGRESS NOTES
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 Subretinal round hyper reflective deposits-stable     Optos C/w exam    Intraocular lens calcs 11/17/22 reliable    Assessment    # POM 4 s/p 25gPPV/SBP/EL/SF6/EL left eye and PRP right eye 07/31/2022  - Vision clear, has plateaud due to myopic shift and PSC  - MRx with large myopic shift left eye but BCVA 20/100 worse than pinhole of 20/80, likely PSC preventing improvement  - Retina flat and attached 360 both eyes  - IOP good both eyes    # PSC cataract, left eye  - visually significant; MARLENY 20/30  Visually significant:YES  Glare: Positive   Reports impacts ADL   Dilation:Good  Iris expansion: Not needed  Pseudoexfoliation: NO  Trypan Blue: YES  Phacodonesis: No  Cornea guttae: rare  Aim for: -0.50  Start artificial tears twice a day and as needed    Anesthesia: peribulbar block on asa 81  Surgical time: 30 minutes  Candidate for multifocal: NO  Candidate for toric IOL: NO  Anticoagulants: NO  Alpha blockers/Flomax: NO    Plan:  -IOL Biometry today  - Pt hopes to get procedure done before the end of the year  - We reviewed the indications, risks, benefits, and alternatives of cataract surgery. The risks include infection, bleeding in the eye, retained pieces of cataract inside the eye, corneal swelling, detachment or swelling of the retina, new floaters in the vision, iris or pupil irregularities, drooping of the eyelid, glaucoma, an imbalance between the eyes and/or double vision, possible need for intraocular lens implant repositioning, exchange, or removal, or complications from anesthesia. The patient understands that these complications can  result in poor vision, loss of vision, or loss of the eye and may result in the need for additional surgery.  We reviewed the patient's visual needs at distance, needs, and intermediate, and used this information to reviewed the intraocular lens options: monofocal, toric, and multifocal lens options.     Based upon our question/answer/discussion, the best intraocular lens option (likely nontoric/monofocal) and refractive goal (di) was selected for the patient based on their stated vision needs and goals. The patient understands that the desired refractive outcome is not guaranteed, and some postoperative optical correction with spectacles or contact lenses to refine vision for distance, near, or both may be necessary.    All questions answered to patient's apparent satisfaction. Patient acknowledged understanding of the situation and desire to proceed with the surgery as outlined. Patient wishes to have eye surgical scheduling call her to arrange the appropriate appointments.    RTC: POD1     ~~~~~~~~~~~~~~~~~~~~~~~~~~~~~~~~~~  Avery Corley MD MPH  Vitreoretinal Fellow PGY-5  [facility]   ~~~~~~~~~~~~~~~~~~~~~~~~~~~~~~~~~~   Complete documentation of historical and exam elements from today's encounter can be found in the full encounter summary report (not reduplicated in this progress note).  I personally obtained the chief complaint(s) and history of present illness.  I confirmed and edited as necessary the review of systems, past medical/surgical history, family history, social history, and examination findings as documented by others; and I examined the patient myself.  I personally reviewed the relevant tests, images, and reports as documented above.  I personally reviewed the ophthalmic test(s) associated with this encounter, agree with the interpretation(s) as documented by the resident/fellow, and have edited the corresponding report(s) as necessary.   I formulated and edited as necessary the  assessment and plan and discussed the findings and management plan with the patient and family    Margot Hagan MD  Professor of Ophthalmology  Vitreo-Retinal surgeon   Department of Ophthalmology and Visual Neurosciences   [facility]  Phone: [phone]   Fax: [phone]

## 2022-11-21 ENCOUNTER — TELEPHONE (OUTPATIENT)
Dept: OPHTHALMOLOGY | Facility: CLINIC | Age: 57
End: 2022-11-21

## 2022-11-21 PROBLEM — H25.042 POSTERIOR SUBCAPSULAR POLAR SENILE CATARACT OF LEFT EYE: Status: ACTIVE | Noted: 2022-11-21

## 2022-11-21 NOTE — TELEPHONE ENCOUNTER
I called patient to schedule surgery with Dr. Margot Hagan, I left a voicemail with callback # [phone].

## 2022-11-21 NOTE — TELEPHONE ENCOUNTER
Patient is scheduled for surgery with Dr. Margot Hagan     Spoke with: Erwin     Date of Surgery: 12/13/22     Location: [facility]:  [address] [place] [id number]     H&P will be completed at: [facility] testing: At home rapid antigen test    Post Op scheduled on 12/14, and 12/21     Surgery packet was mailed 11/21     Additional comments: Advised RN will call 1 - 3 business days prior with arrival time and instructions. Informed patient they will need an adult  and responsible adult to stay with for 24 hours following surgery

## 2022-11-28 ENCOUNTER — TELEPHONE (OUTPATIENT)
Dept: LAB | Facility: CLINIC | Age: 57
End: 2022-11-28

## 2022-11-29 ENCOUNTER — OFFICE VISIT (OUTPATIENT)
Dept: FAMILY MEDICINE | Facility: CLINIC | Age: 57
End: 2022-11-29
Payer: COMMERCIAL

## 2022-11-29 ENCOUNTER — TELEPHONE (OUTPATIENT)
Dept: FAMILY MEDICINE | Facility: CLINIC | Age: 57
End: 2022-11-29

## 2022-11-29 ENCOUNTER — LAB (OUTPATIENT)
Dept: LAB | Facility: CLINIC | Age: 57
End: 2022-11-29
Payer: COMMERCIAL

## 2022-11-29 VITALS
HEART RATE: 82 BPM | SYSTOLIC BLOOD PRESSURE: 136 MMHG | DIASTOLIC BLOOD PRESSURE: 76 MMHG | BODY MASS INDEX: 29.16 KG/M2 | RESPIRATION RATE: 16 BRPM | WEIGHT: 192.4 LBS | OXYGEN SATURATION: 95 % | TEMPERATURE: 97.4 F | HEIGHT: 68 IN

## 2022-11-29 DIAGNOSIS — E78.2 MIXED HYPERLIPIDEMIA: ICD-10-CM

## 2022-11-29 DIAGNOSIS — Z11.4 SCREENING FOR HIV (HUMAN IMMUNODEFICIENCY VIRUS): ICD-10-CM

## 2022-11-29 DIAGNOSIS — Z11.59 NEED FOR HEPATITIS C SCREENING TEST: ICD-10-CM

## 2022-11-29 DIAGNOSIS — H25.042 POSTERIOR SUBCAPSULAR POLAR SENILE CATARACT OF LEFT EYE: ICD-10-CM

## 2022-11-29 DIAGNOSIS — Z01.818 PREOP GENERAL PHYSICAL EXAM: Primary | ICD-10-CM

## 2022-11-29 DIAGNOSIS — Z13.220 SCREENING CHOLESTEROL LEVEL: ICD-10-CM

## 2022-11-29 LAB
CHOLEST SERPL-MCNC: 173 MG/DL
HDLC SERPL-MCNC: 45 MG/DL
HOLD SPECIMEN: NORMAL
LDLC SERPL CALC-MCNC: 103 MG/DL
NONHDLC SERPL-MCNC: 128 MG/DL
TRIGL SERPL-MCNC: 126 MG/DL

## 2022-11-29 PROCEDURE — 86803 HEPATITIS C AB TEST: CPT

## 2022-11-29 PROCEDURE — 80061 LIPID PANEL: CPT

## 2022-11-29 PROCEDURE — 87389 HIV-1 AG W/HIV-1&-2 AB AG IA: CPT

## 2022-11-29 PROCEDURE — 99214 OFFICE O/P EST MOD 30 MIN: CPT | Performed by: FAMILY MEDICINE

## 2022-11-29 PROCEDURE — 36415 COLL VENOUS BLD VENIPUNCTURE: CPT

## 2022-11-29 RX ORDER — ATORVASTATIN CALCIUM 10 MG/1
10 TABLET, FILM COATED ORAL DAILY
Qty: 90 TABLET | Refills: 3 | Status: SHIPPED | OUTPATIENT
Start: 2022-11-29 | End: 2023-11-30

## 2022-11-29 ASSESSMENT — PAIN SCALES - GENERAL: PAINLEVEL: NO PAIN (0)

## 2022-11-29 NOTE — TELEPHONE ENCOUNTER
I messaged the lab back yesterday saying I would not be putting in orders.  Patient needs to be physically seen in clinic before ordering labs, last seen 9/2021 and has had clinic appointment cancellations for December.    Melyssa Stoll, DO

## 2022-11-29 NOTE — TELEPHONE ENCOUNTER
Patient saw Dr. Cannon today 11/29/22. Looks like labs were ordered and atorvastatin was reordered.    Ishmael Enriquez RN     [facility]

## 2022-11-29 NOTE — PROGRESS NOTES
[facility]  [address] [facility] [address]  Phone: [phone]  Primary Provider: Melyssa Stoll  Pre-op Performing Provider: AMARJIT HICKMAN      PREOPERATIVE EVALUATION:  Today's date: 11/29/2022    Rashawn Alonso is a 57 year old male who presents for a preoperative evaluation.    Surgical Information:  Surgery/Procedure: LEFT EYE PHACOEMULSIFICATION, CATARACT, WITH STANDARD INTRAOCULAR LENS IMPLANT INSERTION  Surgery Location: [facility]  Surgeon: Dr. Hagan  Surgery Date: 12/13/2022  Time of Surgery: 8:00 am  Where patient plans to recover: At home with family  Fax number for surgical facility: Note does not need to be faxed, will be available electronically in Epic.    Type of Anesthesia Anticipated: MAC with Block    Assessment & Plan     The proposed surgical procedure is considered LOW risk.    Preop general physical exam    Posterior subcapsular polar senile cataract of left eye    Screening for HIV (human immunodeficiency virus)  - HIV Antigen Antibody Combo; Future    Need for hepatitis C screening test  - Hepatitis C Screen Reflex to HCV RNA Quant and Genotype; Future    Screening cholesterol level  - Lipid panel reflex to direct LDL Fasting; Future           Risks and Recommendations:  The patient has the following additional risks and recommendations for perioperative complications:   - No identified additional risk factors other than previously addressed    Medication Instructions:  Patient is to take all scheduled medications on the day of surgery    RECOMMENDATION:  APPROVAL GIVEN to proceed with proposed procedure, without further diagnostic evaluation.      Subjective     HPI related to upcoming procedure: Left eye blurring for few years worsening in the last year. Very poor vision in the left eye.     Preop Questions 11/22/2022   1. Have you ever had a heart attack or stroke? No   2. Have you ever had surgery on your heart  or blood vessels, such as a stent placement, a coronary artery bypass, or surgery on an artery in your head, neck, heart, or legs? YES - 3 ablation for a fib   3. Do you have chest pain with activity? No   4. Do you have a history of  heart failure? No   5. Do you currently have a cold, bronchitis or symptoms of other infection? No   6. Do you have a cough, shortness of breath, or wheezing? No   7. Do you or anyone in your family have previous history of blood clots? No   8. Do you or does anyone in your family have a serious bleeding problem such as prolonged bleeding following surgeries or cuts? No   9. Have you ever had problems with anemia or been told to take iron pills? No   10. Have you had any abnormal blood loss such as black, tarry or bloody stools? No   11. Have you ever had a blood transfusion? No   12. Are you willing to have a blood transfusion if it is medically needed before, during, or after your surgery? Yes   13. Have you or any of your relatives ever had problems with anesthesia? YES - none for the patient.   14. Do you have sleep apnea, excessive snoring or daytime drowsiness? No   15. Do you have any artifical heart valves or other implanted medical devices like a pacemaker, defibrillator, or continuous glucose monitor? No   16. Do you have artificial joints? No   17. Are you allergic to latex? No       Health Care Directive:  Patient does not have a Health Care Directive or Living Will: Patient states has Advance Directive and will bring in a copy to clinic.    Preoperative Review of :   reviewed - no record of controlled substances prescribed.    Status of Chronic Conditions:  HYPERLIPIDEMIA - Patient has a long history of significant Hyperlipidemia requiring medication for treatment with recent good control. Patient reports no problems or side effects with the medication.     Blepharitis: on doxycycline daily for years.    Review of Systems  CONSTITUTIONAL: NEGATIVE for fever, chills,  change in weight  INTEGUMENTARY/SKIN: NEGATIVE for worrisome rashes, moles or lesions  EYES: NEGATIVE for vision changes or irritation  ENT/MOUTH: NEGATIVE for ear, mouth and throat problems  RESP: NEGATIVE for significant cough or SOB  CV: NEGATIVE for chest pain, palpitations or peripheral edema  GI: NEGATIVE for nausea, abdominal pain, heartburn, or change in bowel habits  : NEGATIVE for frequency, dysuria, or hematuria  MUSCULOSKELETAL: NEGATIVE for significant arthralgias or myalgia  NEURO: NEGATIVE for weakness, dizziness or paresthesias  ENDOCRINE: NEGATIVE for temperature intolerance, skin/hair changes  HEME: NEGATIVE for bleeding problems  PSYCHIATRIC: NEGATIVE for changes in mood or affect    Patient Active Problem List    Diagnosis Date Noted     Posterior subcapsular polar senile cataract of left eye 11/21/2022     Priority: Medium     Added automatically from request for surgery [id number]       Hyperlipidemia 08/15/2011     Priority: Medium     Umbilical hernia with obstruction 05/02/2008     Priority: Medium      Past Medical History:   Diagnosis Date     A-fib (H)     ablation     Cardiomyopathy, secondary (H)     to arrhythmia-resolved w/ normal cardiac echo 2013     H/O cardiac radiofrequency ablation 01/01/2000     Hypercholesteremia      Mitral insufficiency      S/P pulmonary vein repair 01/01/2010    repeat ablation     S/P pulmonary vein repair 01/01/2011     Past Surgical History:   Procedure Laterality Date     COLONOSCOPY       COLONOSCOPY N/A 06/21/2017    Procedure: COLONOSCOPY;  colonoscopy ;  Surgeon: Brian Mistry MD;  Location: [facility]     EP ABLATION / EP STUDIES      for afib     EXAM UNDER ANESTHESIA, LASER DIODE RETINA, COMBINED Right 7/31/2022    Procedure: Right Eye Green Diode Laser;  Surgeon: Margot Hagan MD;  Location: [facility] OR     HERNIA REPAIR       HERNIA REPAIR, UMBILICAL       OTHER SURGICAL HISTORY      BILATERAL INGUINAL HERNIORRHAPY     RADIOFREQUENCY  "ABLATION      X 2     RETINAL REATTACHMENT       VASECTOMY       Current Outpatient Medications   Medication Sig Dispense Refill     ASPIRIN PO Take 81 mg by mouth daily       atorvastatin (LIPITOR) 10 MG tablet TAKE 1 TABLET BY MOUTH  DAILY 30 tablet 3     DOXYCYCLINE HYCLATE PO Take 50 mg by mouth daily       multivitamin w/minerals (THERA-VIT-M) tablet Take 1 tablet by mouth daily       Vitamin D, Cholecalciferol, 25 MCG (1000 UT) CAPS Take 4,000 Units by mouth         No Known Allergies     Social History     Tobacco Use     Smoking status: Never     Smokeless tobacco: Never   Substance Use Topics     Alcohol use: Not on file     Comment: social     History   Drug Use Unknown         Objective     /76   Pulse 82   Temp 97.4  F (36.3  C) (Tympanic)   Resp 16   Ht 1.72 m (5' 7.72\")   Wt 87.3 kg (192 lb 6.4 oz)   SpO2 95%   BMI 29.50 kg/m      Physical Exam    GENERAL APPEARANCE: healthy, alert and no distress     EYES: left eye scleral redness EOMI,  PERRL     HENT: ear canals and TM's normal and nose and mouth without ulcers or lesions     NECK: no adenopathy, no asymmetry, masses, or scars and thyroid normal to palpation     RESP: lungs clear to auscultation - no rales, rhonchi or wheezes     CV: regular rates and rhythm, normal S1 S2, no S3 or S4 and no murmur, click or rub     ABDOMEN:  soft, nontender, no HSM or masses and bowel sounds normal     MS: extremities normal- no gross deformities noted, no evidence of inflammation in joints, FROM in all extremities.     SKIN: no suspicious lesions or rashes     NEURO: Normal strength and tone, sensory exam grossly normal, mentation intact and speech normal     PSYCH: mentation appears normal. and affect normal/bright     LYMPHATICS: No cervical adenopathy    No results for input(s): HGB, PLT, INR, NA, POTASSIUM, CR, A1C in the last 20421 hours.     Diagnostics:  No labs were ordered during this visit.   No EKG required for low risk surgery (cataract, " skin procedure, breast biopsy, etc).  No EKG required, no history of coronary heart disease, significant arrhythmia, peripheral arterial disease or other structural heart disease.    Revised Cardiac Risk Index (RCRI):  The patient has the following serious cardiovascular risks for perioperative complications:   - No serious cardiac risks = 0 points     RCRI Interpretation: 0 points: Class I (very low risk - 0.4% complication rate)           Signed Electronically by: Fabricio Cannon MD  Copy of this evaluation report is provided to requesting physician.

## 2022-11-29 NOTE — TELEPHONE ENCOUNTER
Pt had labs drawn at [facility] this morning, but there are no orders for the labs. Pt needed to have the labs done for med check/refill. Please put in orders so lab can process the bloodwork.

## 2022-11-29 NOTE — PATIENT INSTRUCTIONS
Ok to take your usual Medications on the day of surgery.      Preparing for Your Surgery  Getting started  A nurse will call you to review your health history and instructions. They will give you an arrival time based on your scheduled surgery time. Please be ready to share:  Your doctor s clinic name and phone number  Your medical, surgical, and anesthesia history  A list of allergies and sensitivities  A list of medicines, including herbal treatments and over-the-counter drugs  Whether the patient has a legal guardian (ask how to send us the papers in advance)  Please tell us if you re pregnant--or if there s any chance you might be pregnant. Some surgeries may injure a fetus (unborn baby), so they require a pregnancy test. Surgeries that are safe for a fetus don t always need a test, and you can choose whether to have one.   If you have a child who s having surgery, please ask for a copy of Preparing for Your Child s Surgery.    Preparing for surgery  Within 10 to 30 days of surgery: Have a pre-op exam (sometimes called an H&P, or History and Physical). This can be done at a clinic or pre-operative center.  If you re having a , you may not need this exam. Talk to your care team.  At your pre-op exam, talk to your care team about all medicines you take. If you need to stop any medicines before surgery, ask when to start taking them again.  We do this for your safety. Many medicines can make you bleed too much during surgery. Some change how well surgery (anesthesia) drugs work.  Call your insurance company to let them know you re having surgery. (If you don t have insurance, call [phone].)  Call your clinic if there s any change in your health. This includes signs of a cold or flu (sore throat, runny nose, cough, rash, fever). It also includes a scrape or scratch near the surgery site.  If you have questions on the day of surgery, call your hospital or surgery center.  COVID testing  You may need to  be tested for COVID-19 before having surgery. If so, we will give you instructions (or click here).  Eating and drinking guidelines  For your safety: Unless your surgeon tells you otherwise, follow the guidelines below.  Eat and drink as usual until 8 hours before you arrive for surgery. After that, no food or milk.  Drink clear liquids until 2 hours before you arrive. These are liquids you can see through, like water, Gatorade, and Propel Water. They also include plain black coffee and tea (no cream or milk), candy, and breath mints. You can spit out gum when you arrive.  If you drink alcohol: Stop drinking it the night before surgery.  If your care team tells you to take medicine on the morning of surgery, it s okay to take it with a sip of water.  Preventing infection  Shower or bathe the night before and morning of your surgery. Follow the instructions your clinic gave you. (If no instructions, use regular soap.)  Don t shave or clip hair near your surgery site. We ll remove the hair if needed.  Don t smoke or vape the morning of surgery. You may chew nicotine gum up to 2 hours before surgery. A nicotine patch is okay.  Note: Some surgeries require you to completely quit smoking and nicotine. Check with your surgeon.  Your care team will make every effort to keep you safe from infection. We will:  Clean our hands often with soap and water (or an alcohol-based hand rub).  Clean the skin at your surgery site with a special soap that kills germs.  Give you a special gown to keep you warm. (Cold raises the risk of infection.)  Wear special hair covers, masks, gowns and gloves during surgery.  Give antibiotic medicine, if prescribed. Not all surgeries need antibiotics.  What to bring on the day of surgery  Photo ID and insurance card  Copy of your health care directive, if you have one  Glasses and hearing aids (bring cases)  You can t wear contacts during surgery  Inhaler and eye drops, if you use them (tell us  about these when you arrive)  CPAP machine or breathing device, if you use them  A few personal items, if spending the night  If you have . . .  A pacemaker, ICD (cardiac defibrillator) or other implant: Bring the ID card.  An implanted stimulator: Bring the remote control.  A legal guardian: Bring a copy of the certified (court-stamped) guardianship papers.  Please remove any jewelry, including body piercings. Leave jewelry and other valuables at home.  If you re going home the day of surgery  You must have a responsible adult drive you home. They should stay with you overnight as well.  If you don t have someone to stay with you, and you aren t safe to go home alone, we may keep you overnight. Insurance often won t pay for this.  After surgery  If it s hard to control your pain or you need more pain medicine, please call your surgeon s office.  Questions?   If you have any questions for your care team, list them here:   ____________________________________________________________________________________________________________________________________________________________________________________________________________________________________________________________________  For informational purposes only. Not to replace the advice of your health care provider. Copyright   2003, 2019 [employer]. All rights reserved. Clinically reviewed by Delphine Zavala MD. [employer] [id number] - REV 10/22.

## 2022-11-29 NOTE — TELEPHONE ENCOUNTER
Patient had labs drawn at [facility] today for medication refill, but no orders exist and lab is asking to have orders placed. Patient's last office visit was 9/30/21, was advised needed appointment last refill on 9/17/22 and had been scheduled. That appointment has since been cancelled. Please advise.    Amanda Emmanuel RN

## 2022-11-30 LAB
HCV AB SERPL QL IA: NONREACTIVE
HIV 1+2 AB+HIV1 P24 AG SERPL QL IA: NONREACTIVE

## 2022-12-03 ENCOUNTER — HEALTH MAINTENANCE LETTER (OUTPATIENT)
Age: 57
End: 2022-12-03

## 2022-12-12 ENCOUNTER — ANESTHESIA EVENT (OUTPATIENT)
Dept: SURGERY | Facility: AMBULATORY SURGERY CENTER | Age: 57
End: 2022-12-12
Payer: COMMERCIAL

## 2022-12-12 ENCOUNTER — MYC MEDICAL ADVICE (OUTPATIENT)
Dept: FAMILY MEDICINE | Facility: CLINIC | Age: 57
End: 2022-12-12

## 2022-12-12 ASSESSMENT — ENCOUNTER SYMPTOMS: DYSRHYTHMIAS: 0

## 2022-12-12 ASSESSMENT — NEW YORK HEART ASSOCIATION (NYHA) CLASSIFICATION: NYHA FUNCTIONAL CLASS: I

## 2022-12-13 ENCOUNTER — ANESTHESIA (OUTPATIENT)
Dept: SURGERY | Facility: AMBULATORY SURGERY CENTER | Age: 57
End: 2022-12-13
Payer: COMMERCIAL

## 2022-12-13 ENCOUNTER — HOSPITAL ENCOUNTER (OUTPATIENT)
Facility: AMBULATORY SURGERY CENTER | Age: 57
Discharge: HOME | End: 2022-12-13
Attending: OPHTHALMOLOGY
Payer: COMMERCIAL

## 2022-12-13 VITALS
TEMPERATURE: 97.7 F | RESPIRATION RATE: 16 BRPM | SYSTOLIC BLOOD PRESSURE: 136 MMHG | HEIGHT: 69 IN | DIASTOLIC BLOOD PRESSURE: 85 MMHG | WEIGHT: 190 LBS | BODY MASS INDEX: 28.14 KG/M2 | OXYGEN SATURATION: 95 % | HEART RATE: 78 BPM

## 2022-12-13 DIAGNOSIS — Z48.810 AFTERCARE FOLLOWING SURGERY OF A SENSE ORGAN: Primary | ICD-10-CM

## 2022-12-13 DIAGNOSIS — H25.042 POSTERIOR SUBCAPSULAR POLAR SENILE CATARACT OF LEFT EYE: ICD-10-CM

## 2022-12-13 PROCEDURE — 66984 XCAPSL CTRC RMVL W/O ECP: CPT | Mod: LT

## 2022-12-13 PROCEDURE — 66984 XCAPSL CTRC RMVL W/O ECP: CPT | Mod: LT | Performed by: OPHTHALMOLOGY

## 2022-12-13 DEVICE — CLAREON™ ASPHERIC UV ABSORBING IOL - STERILE UV-ABSORBING HYDROPHOBIC ACRYLIC FOLDABLE ASPHERIC POSTERIOR CHAMBER INTRAOCULAR LENS
Type: IMPLANTABLE DEVICE | Site: EYE | Status: FUNCTIONAL
Brand: CLAREON™

## 2022-12-13 RX ORDER — PREDNISOLONE ACETATE 10 MG/ML
1 SUSPENSION/ DROPS OPHTHALMIC 4 TIMES DAILY
Qty: 5 ML | Refills: 1 | Status: SHIPPED | OUTPATIENT
Start: 2022-12-13 | End: 2022-12-21

## 2022-12-13 RX ORDER — ONDANSETRON 2 MG/ML
4 INJECTION INTRAMUSCULAR; INTRAVENOUS EVERY 30 MIN PRN
Status: DISCONTINUED | OUTPATIENT
Start: 2022-12-13 | End: 2022-12-14 | Stop reason: HOSPADM

## 2022-12-13 RX ORDER — FENTANYL CITRATE 50 UG/ML
25 INJECTION, SOLUTION INTRAMUSCULAR; INTRAVENOUS EVERY 5 MIN PRN
Status: DISCONTINUED | OUTPATIENT
Start: 2022-12-13 | End: 2022-12-14 | Stop reason: HOSPADM

## 2022-12-13 RX ORDER — MEPERIDINE HYDROCHLORIDE 25 MG/ML
12.5 INJECTION INTRAMUSCULAR; INTRAVENOUS; SUBCUTANEOUS
Status: DISCONTINUED | OUTPATIENT
Start: 2022-12-13 | End: 2022-12-14 | Stop reason: HOSPADM

## 2022-12-13 RX ORDER — ACETAMINOPHEN 325 MG/1
975 TABLET ORAL ONCE
Status: COMPLETED | OUTPATIENT
Start: 2022-12-13 | End: 2022-12-13

## 2022-12-13 RX ORDER — PROPOFOL 10 MG/ML
INJECTION, EMULSION INTRAVENOUS PRN
Status: DISCONTINUED | OUTPATIENT
Start: 2022-12-13 | End: 2022-12-13

## 2022-12-13 RX ORDER — CYCLOPENTOLAT/TROPIC/PHENYLEPH 1%-1%-2.5%
1 DROPS (EA) OPHTHALMIC (EYE)
Status: COMPLETED | OUTPATIENT
Start: 2022-12-13 | End: 2022-12-13

## 2022-12-13 RX ORDER — ONDANSETRON 4 MG/1
4 TABLET, ORALLY DISINTEGRATING ORAL EVERY 30 MIN PRN
Status: DISCONTINUED | OUTPATIENT
Start: 2022-12-13 | End: 2022-12-14 | Stop reason: HOSPADM

## 2022-12-13 RX ORDER — BALANCED SALT SOLUTION 6.4; .75; .48; .3; 3.9; 1.7 MG/ML; MG/ML; MG/ML; MG/ML; MG/ML; MG/ML
SOLUTION OPHTHALMIC PRN
Status: DISCONTINUED | OUTPATIENT
Start: 2022-12-13 | End: 2022-12-13 | Stop reason: HOSPADM

## 2022-12-13 RX ORDER — TETRACAINE HYDROCHLORIDE 5 MG/ML
SOLUTION OPHTHALMIC PRN
Status: DISCONTINUED | OUTPATIENT
Start: 2022-12-13 | End: 2022-12-13 | Stop reason: HOSPADM

## 2022-12-13 RX ORDER — DEXAMETHASONE SODIUM PHOSPHATE 4 MG/ML
INJECTION, SOLUTION INTRA-ARTICULAR; INTRALESIONAL; INTRAMUSCULAR; INTRAVENOUS; SOFT TISSUE PRN
Status: DISCONTINUED | OUTPATIENT
Start: 2022-12-13 | End: 2022-12-13 | Stop reason: HOSPADM

## 2022-12-13 RX ORDER — HYDROMORPHONE HYDROCHLORIDE 1 MG/ML
0.2 INJECTION, SOLUTION INTRAMUSCULAR; INTRAVENOUS; SUBCUTANEOUS EVERY 5 MIN PRN
Status: DISCONTINUED | OUTPATIENT
Start: 2022-12-13 | End: 2022-12-14 | Stop reason: HOSPADM

## 2022-12-13 RX ORDER — SODIUM CHLORIDE, SODIUM LACTATE, POTASSIUM CHLORIDE, CALCIUM CHLORIDE 600; 310; 30; 20 MG/100ML; MG/100ML; MG/100ML; MG/100ML
INJECTION, SOLUTION INTRAVENOUS CONTINUOUS
Status: DISCONTINUED | OUTPATIENT
Start: 2022-12-13 | End: 2022-12-14 | Stop reason: HOSPADM

## 2022-12-13 RX ORDER — KETOROLAC TROMETHAMINE 5 MG/ML
1 SOLUTION OPHTHALMIC 4 TIMES DAILY
Qty: 5 ML | Refills: 0 | Status: SHIPPED | OUTPATIENT
Start: 2022-12-13 | End: 2023-03-30

## 2022-12-13 RX ORDER — LIDOCAINE 40 MG/G
CREAM TOPICAL
Status: DISCONTINUED | OUTPATIENT
Start: 2022-12-13 | End: 2022-12-14 | Stop reason: HOSPADM

## 2022-12-13 RX ORDER — OFLOXACIN 3 MG/ML
1 SOLUTION/ DROPS OPHTHALMIC 4 TIMES DAILY
Qty: 5 ML | Refills: 0 | Status: SHIPPED | OUTPATIENT
Start: 2022-12-13 | End: 2023-03-30

## 2022-12-13 RX ORDER — LIDOCAINE HYDROCHLORIDE 10 MG/ML
INJECTION, SOLUTION EPIDURAL; INFILTRATION; INTRACAUDAL; PERINEURAL PRN
Status: DISCONTINUED | OUTPATIENT
Start: 2022-12-13 | End: 2022-12-13 | Stop reason: HOSPADM

## 2022-12-13 RX ORDER — OXYCODONE HYDROCHLORIDE 5 MG/1
5 TABLET ORAL EVERY 4 HOURS PRN
Status: DISCONTINUED | OUTPATIENT
Start: 2022-12-13 | End: 2022-12-14 | Stop reason: HOSPADM

## 2022-12-13 RX ORDER — LIDOCAINE HYDROCHLORIDE 20 MG/ML
INJECTION, SOLUTION INFILTRATION; PERINEURAL PRN
Status: DISCONTINUED | OUTPATIENT
Start: 2022-12-13 | End: 2022-12-13

## 2022-12-13 RX ORDER — ONDANSETRON 2 MG/ML
INJECTION INTRAMUSCULAR; INTRAVENOUS PRN
Status: DISCONTINUED | OUTPATIENT
Start: 2022-12-13 | End: 2022-12-13

## 2022-12-13 RX ORDER — OXYCODONE HYDROCHLORIDE 5 MG/1
10 TABLET ORAL EVERY 4 HOURS PRN
Status: DISCONTINUED | OUTPATIENT
Start: 2022-12-13 | End: 2022-12-14 | Stop reason: HOSPADM

## 2022-12-13 RX ORDER — FENTANYL CITRATE 50 UG/ML
50 INJECTION, SOLUTION INTRAMUSCULAR; INTRAVENOUS EVERY 5 MIN PRN
Status: DISCONTINUED | OUTPATIENT
Start: 2022-12-13 | End: 2022-12-14 | Stop reason: HOSPADM

## 2022-12-13 RX ORDER — FENTANYL CITRATE 50 UG/ML
25 INJECTION, SOLUTION INTRAMUSCULAR; INTRAVENOUS
Status: DISCONTINUED | OUTPATIENT
Start: 2022-12-13 | End: 2022-12-14 | Stop reason: HOSPADM

## 2022-12-13 RX ORDER — HYDROMORPHONE HYDROCHLORIDE 1 MG/ML
0.4 INJECTION, SOLUTION INTRAMUSCULAR; INTRAVENOUS; SUBCUTANEOUS EVERY 5 MIN PRN
Status: DISCONTINUED | OUTPATIENT
Start: 2022-12-13 | End: 2022-12-14 | Stop reason: HOSPADM

## 2022-12-13 RX ORDER — PROPARACAINE HYDROCHLORIDE 5 MG/ML
1 SOLUTION/ DROPS OPHTHALMIC ONCE
Status: COMPLETED | OUTPATIENT
Start: 2022-12-13 | End: 2022-12-13

## 2022-12-13 RX ORDER — FENTANYL CITRATE 50 UG/ML
INJECTION, SOLUTION INTRAMUSCULAR; INTRAVENOUS PRN
Status: DISCONTINUED | OUTPATIENT
Start: 2022-12-13 | End: 2022-12-13

## 2022-12-13 RX ADMIN — Medication 1 DROP: at 07:06

## 2022-12-13 RX ADMIN — LIDOCAINE HYDROCHLORIDE 100 MG: 20 INJECTION, SOLUTION INFILTRATION; PERINEURAL at 08:12

## 2022-12-13 RX ADMIN — FENTANYL CITRATE 50 MCG: 50 INJECTION, SOLUTION INTRAMUSCULAR; INTRAVENOUS at 08:11

## 2022-12-13 RX ADMIN — ACETAMINOPHEN 975 MG: 325 TABLET ORAL at 07:12

## 2022-12-13 RX ADMIN — SODIUM CHLORIDE, SODIUM LACTATE, POTASSIUM CHLORIDE, CALCIUM CHLORIDE: 600; 310; 30; 20 INJECTION, SOLUTION INTRAVENOUS at 07:07

## 2022-12-13 RX ADMIN — Medication 1 DROP: at 07:14

## 2022-12-13 RX ADMIN — PROPOFOL 100 MG: 10 INJECTION, EMULSION INTRAVENOUS at 08:12

## 2022-12-13 RX ADMIN — PROPARACAINE HYDROCHLORIDE 1 DROP: 5 SOLUTION/ DROPS OPHTHALMIC at 07:00

## 2022-12-13 RX ADMIN — ONDANSETRON 4 MG: 2 INJECTION INTRAMUSCULAR; INTRAVENOUS at 08:14

## 2022-12-13 RX ADMIN — Medication 1 DROP: at 07:01

## 2022-12-13 NOTE — BRIEF OP NOTE
[facility]    Brief Operative Note    Pre-operative diagnosis: Posterior subcapsular polar senile cataract of left eye [H25.042]  Post-operative diagnosis Same as pre-operative diagnosis    Procedure: Procedure(s):  LEFT EYE PHACOEMULSIFICATION, CATARACT, WITH STANDARD INTRAOCULAR LENS IMPLANT INSERTION  Surgeon: Surgeon(s) and Role:     * Margot Hagan MD - Primary     * Avery Corlye MD - Fellow - Assisting   Beba Fregoso MD - Assisting  Anesthesia: MAC with Block   Estimated Blood Loss: None    Drains: None  Specimens: * No specimens in log *  Findings:   None.  Complications: None.  Implants:   Implant Name Type Inv. Item Serial No.  Lot No. LRB No. Used Action   Clareon IOL Lens 17.5D   [id number] OLEG  Left 1 Implanted       Beba Fregoso MD  Ophthalmology Resident, PGY-3  [facility]

## 2022-12-13 NOTE — OP NOTE
SURGEON:   RICARDA DUENAS   Assistant surgeon: Beba Fregoso MD  PREOPERATIVE DIAGNOSIS:   1. visually significant cataract     POSTOPERATIVE DIAGNOSIS: same  NAME OF THE PROCEDURE: Phacoemulsification with intraocular lens implantation, [id number] 17.5 D lens  ANESTHESIA: Monitored anesthesia care and peribulbar block   COMPLICATIONS: none  INDICATIONS: Rashawn Alonso is a 57 year old with diagnosis of visually significant cataract, here for cataract surgery    DESCRIPTION OF THE PROCEDURE:  The patient was taken to the operative room where intravenous sedation was administered and a perbulbar block consisting of a 1:1 mixture of 2%lidocaine and 0.75% marcaine with epinephrine and wydase, was administered to the operative eye with adequate anesthesia and akinesia.    The operative eye was prepped and draped in the usual sterile surgical fashion for ophthalmic surgery, including the installation of one drop of 5% Povidone Iodine.  A sterile drape was placed over the face and body and a lid speculum was inserted.      With the use of a Supersharp blade and 0.12 forceps, a paracentesis was created at the 2 o'clock position. Next, tripan blue was used stain the anterior capsule. The anterior chamber was irrigated with lidocaine. Next, viscoelastic was injected into the anterior chamber using a canula.  A 2.5 mm keratome was then used to construct a clear corneal incision at the 10 o'clock position.  Using Utrata forceps and cystotome needle, a continuous curvilinear capsulorrhexis was created and hydrodissection was undertaken with the use of BSS.  The nucleus was found to be freely mobile and then removed by phacoemulsification using a stop and chop technique.  The remaining elements of cortex were then removed with irrigation/aspiration.  An IOL,was injected into the capsular bag and was rotated into a good position with a Sinskey hook. The remaining elements of viscoelastic were then removed with  irrigation/aspiration and the incisions were hydrated with BSS. The pressure was checked and noted to be adequate. Subconjunctival injection of Dexamethasone and Ancef were administered. The lid speculum was removed and the eye was cleaned with wet and dry gauze. Maxitrol ointment was placed on the eye.  A patch and Whatley shield were placed over the eye.     The patient tolerated the procedure well and was taken to the post anesthesia care unit in stable condition.     Beba Fregoso MD  Ophthalmology Resident, PGY-3  [facility]

## 2022-12-13 NOTE — ANESTHESIA POSTPROCEDURE EVALUATION
Patient: Rashawn Alonso    Procedure: Procedure(s):  LEFT EYE PHACOEMULSIFICATION, CATARACT, WITH STANDARD INTRAOCULAR LENS IMPLANT INSERTION       Anesthesia Type:  MAC    Note:  Disposition: Outpatient   Postop Pain Control: Uneventful            Sign Out: Well controlled pain   PONV: No   Neuro/Psych: Uneventful            Sign Out: Acceptable/Baseline neuro status   Airway/Respiratory: Uneventful            Sign Out: Acceptable/Baseline resp. status   CV/Hemodynamics: Uneventful            Sign Out: Acceptable CV status; No obvious hypovolemia; No obvious fluid overload   Other NRE: NONE   DID A NON-ROUTINE EVENT OCCUR? No           Last vitals:  Vitals Value Taken Time   /85 12/13/22 0917   Temp 36.5  C (97.7  F) 12/13/22 0917   Pulse     Resp 16 12/13/22 0917   SpO2 95 % 12/13/22 0917       Electronically Signed By: Jacoby Cm MD, MD  December 13, 2022  10:00 AM

## 2022-12-13 NOTE — BRIEF OP NOTE
[facility]    Brief Operative Note    Pre-operative diagnosis: Posterior subcapsular polar senile cataract of left eye [H25.042]  Post-operative diagnosis Same as pre-operative diagnosis    Procedure: Procedure(s):  LEFT EYE PHACOEMULSIFICATION, CATARACT, WITH STANDARD INTRAOCULAR LENS IMPLANT INSERTION  Surgeon: Surgeon(s) and Role:     * Margot Hagan MD - Primary     * Avery Corley MD - Fellow - Assisting  Anesthesia: MAC with Block   Estimated Blood Loss: None    Drains: None  Specimens: * No specimens in log *  Findings:   None.  Complications: None.  Implants:   Implant Name Type Inv. Item Serial No.  Lot No. LRB No. Used Action   Clareon IOL Lens 17.5D   [id number] OLEG  Left 1 Implanted

## 2022-12-13 NOTE — OP NOTE
SURGEON:   RICARDA DUENAS   Assistant surgeon: Beba Fregoso MD    PREOPERATIVE DIAGNOSIS:   1. visually significant cataract LEFT EYE  POSTOPERATIVE DIAGNOSIS: same  NAME OF THE PROCEDURE: Phacoemulsification with intraocular lens implantation, LEFT EYE  ANESTHESIA: Monitored anesthesia care and peribulbar block left eye   COMPLICATIONS: none  INDICATIONS: Rashawn Alonso is a 57 year old with diagnosis of visually significant cataract, here for cataract surgery    DESCRIPTION OF THE PROCEDURE:  The patient was taken to the operative room where intravenous sedation was administered and a perbulbar block consisting of a 1:1 mixture of 2%lidocaine and 0.75% marcaine with epinephrine and wydase, was administered to the operative eye with adequate anesthesia and akinesia.    The operative eye was prepped and draped in the usual sterile surgical fashion for ophthalmic surgery, including the installation of one drop of 5% Povidone Iodine.  A sterile drape was placed over the face and body and a lid speculum was inserted.      With the use of a Supersharp blade and 0.12 forceps, a paracentesis was created at the 2 o'clock position, and air was injected into the Anterior chamber. Next, tripan blue was used stain the anterior capsule. The anterior chamber was irrigated. Next, viscoelastic was injected into the anterior chamber using a canula.  A 2.5 mm keratome was then used to construct a clear corneal incision at the 10 o'clock position.  Using Utrata forceps and cystotome needle, a continuous curvilinear capsulorrhexis was created and hydrodissection was undertaken with the use of BSS.  The nucleus was found to be freely mobile and then removed by phacoemulsification using a divide and conquer technique.  The remaining elements of cortex were then removed with irrigation/aspiration.  An IOL,was injected into the capsular bag and was rotated into a good position. The remaining elements of viscoelastic were then removed  with irrigation/aspiration.  The lid speculum was removed.  Subconjunctival injection of Dexamethasone and Ancef were administered.  The eye was cleaned with wet and dry gauze. Maxitrol ointment was placed on the eye.  A patch and Whatley shield were placed over the eye.     Implant Name Type Inv. Item Serial No.  Lot No. LRB No. Used Action   Clareon IOL Lens 17.5D   [id number] OLEG  Left 1 Implanted       The surgery was assisted by Dr.Martha Fregoso. Due to the delicate and complex nature of this surgery, an assistant was required. She assisted with Cataract extraction . I was present for the entire surgery.

## 2022-12-13 NOTE — DISCHARGE INSTRUCTIONS
Dr. Margot Hagan  [facility]  [phone]  Post Operative Cataract Instructions    If you have a gauze eye patch on, please do not remove it until it is removed by your physician at your first post-operative visit.  You will start your eye drops the next day.    Wear the clear eye shield when sleeping for protection for 5 days.    Do not rub the operated eye.    Light sensitivity may be noticed. Sunglasses may be worn for comfort.    Some discomfort and irritation may be noticed. Acetaminophen (Tylenol) or Ibuprofen (Advil) may be taken for discomfort. If pain persists please call Dr. Hagan's office.    Keep the operated eye dry. You may wash your hair, bathe or shower, but keep the operated eye closed while doing so.     If you take glaucoma medications, bring them with you to the clinic on your first post operative visit.    Bring your prescribed eye drops with you to your scheduled post-operative appointment.    Use medication exactly as prescribed by your doctor. You may restart your regular home medications.     Call Dr. Hagan's office at [phone] if any of the following should occur:    Any sudden vision changes, including decreased vision  Nausea or severe headache  Increase in pain not controlled  Signs of infection (pus, increasing redness or tenderness)  Severe sensitivity to light    [facility]  Home Care Following Anesthesia  For 24 hours after surgery:  Get plenty of rest.  A responsible adult must stay with you for at least 24 hours after you leave the surgery center.  Do not drive or use heavy equipment.  If you have weakness or tingling, don't drive or use heavy equipment until this feeling goes away.   Do not drink alcohol.   Avoid strenuous or risky activities.  Ask for help when climbing stairs.  You may feel lightheaded.  IF so, sit for a few minutes before standing.  Have someone help you get up.   If you have nausea (feel sick  to your stomach): Drink only clear liquids such as apple juice, ginger ale, broth or 7-Up.  Rest may also help.  Be sure to drink enough fluids.  Move to a regular diet as you feel able.   You may have a slight fever.  Call the doctor if your fever is over 100 F (37.7 C) (taken under the tongue) or lasts longer than 24 hours.  You may have a dry mouth, a sore throat, muscle aches or trouble sleeping. These should go away after 24 hours.  Do not make important or legal decisions.   It is recommended to avoid smoking.               Tips for taking pain medications  To get the best pain relief possible, remember these points:  Take pain medications as directed, before pain becomes severe.  Pain medication can upset your stomach: taking it with food may help.  Constipation is a common side effect of pain medication. Drink plenty of  fluids.  Eat foods high in fiber. Take a stool softener if recommended by your doctor or pharmacist.  Do not drink alcohol, drive or operate machinery while taking pain medications.  Ask about other ways to control pain, such as with heat, ice or relaxation.    Tylenol/Acetaminophen Consumption  To help encourage the safe use of acetaminophen, the makers of TYLENOL  have lowered the maximum daily dose for single-ingredient Extra Strength TYLENOL  (acetaminophen) products sold in the [place] from 8 pills per day (4,000 mg) to 6 pills per day (3,000 mg). The dosing interval has also changed from 2 pills every 4-6 hours to 2 pills every 6 hours.  If you feel your pain relief is insufficient, you may take Tylenol/Acetaminophen in addition to your narcotic pain medication.   Be careful not to exceed 3,000 mg of Tylenol/Acetaminophen in a 24 hour period from all sources.  If you are taking extra strength Tylenol/acetaminophen (500 mg), the maximum dose is 6 tablets in 24 hours.  If you are taking regular strength acetaminophen (325 mg), the maximum dose is 9 tablets in 24 hours.    Call a doctor for  any of the following:  Signs of infection (fever, growing tenderness at the surgery site, a large amount of drainage or bleeding, severe pain, foul-smelling drainage, redness, swelling).  It has been over 8 to 10 hours since surgery and you are still not able to urinate (pass water).  Headache for over 24 hours.  Numbness, tingling or weakness the day after surgery (if you had spinal anesthesia).  Signs of Covid-19 infection (temperature over 100 degrees, shortness of breath, cough, loss of taste/smell, generalized body aches, persistent headache, chills, sore throat, nausea/vomiting/diarrhea)  Your doctor is:       Dr. Abigail Jiménez, Ophthalmology: [phone]               Or dial [phone] and ask for the resident on call for:  Ophthalmology  For emergency care, call the:  [place] [facility]:  [phone] (TTY for hearing impaired: [phone])

## 2022-12-13 NOTE — ANESTHESIA PREPROCEDURE EVALUATION
Anesthesia Pre-Procedure Evaluation    Patient: Rashawn Alonso   MRN: [id number] : 1965        Procedure : Procedure(s):  LEFT EYE PHACOEMULSIFICATION, CATARACT, WITH STANDARD INTRAOCULAR LENS IMPLANT INSERTION          Past Medical History:   Diagnosis Date     A-fib (H)     ablation     Cardiomyopathy, secondary (H)     to arrhythmia-resolved w/ normal cardiac echo      H/O cardiac radiofrequency ablation 2000     Hypercholesteremia      Mitral insufficiency      S/P pulmonary vein repair 2010    repeat ablation     S/P pulmonary vein repair 2011      Past Surgical History:   Procedure Laterality Date     COLONOSCOPY       COLONOSCOPY N/A 2017    Procedure: COLONOSCOPY;  colonoscopy ;  Surgeon: Brian Mistry MD;  Location: [facility]     EP ABLATION / EP STUDIES      for afib     EXAM UNDER ANESTHESIA, LASER DIODE RETINA, COMBINED Right 2022    Procedure: Right Eye Green Diode Laser;  Surgeon: Margot Hagan MD;  Location: [facility]     HERNIA REPAIR       HERNIA REPAIR, UMBILICAL       OTHER SURGICAL HISTORY      BILATERAL INGUINAL HERNIORRHAPY     RADIOFREQUENCY ABLATION      X 2     RETINAL REATTACHMENT       VASECTOMY        No Known Allergies   Social History     Tobacco Use     Smoking status: Never     Smokeless tobacco: Never   Substance Use Topics     Alcohol use: Not on file     Comment: social      Wt Readings from Last 1 Encounters:   22 87.3 kg (192 lb 6.4 oz)        Anesthesia Evaluation            ROS/MED HX  ENT/Pulmonary:    Recent URI: MR.   Neurologic:    : 55.   Cardiovascular:     (+) -----CHF (afib/mr) date:  NYHA classification: I. Irregular Heartbeat/Palpitations,  (-) arrhythmias   METS/Exercise Tolerance:     Hematologic:       Musculoskeletal:       GI/Hepatic:  - neg GI/hepatic ROS     Renal/Genitourinary:  - neg Renal ROS     Endo:       Psychiatric/Substance Use:       Infectious Disease:       Malignancy:       Other:                OUTSIDE LABS:  CBC:   Lab Results   Component Value Date    WBC 5.7 05/23/2018    WBC 7.3 04/16/2009    HGB 15.0 05/23/2018    HGB 14.8 04/16/2009    HCT 44.8 05/23/2018    HCT 42.2 04/16/2009     05/23/2018     04/16/2009     BMP:   Lab Results   Component Value Date     05/23/2018    POTASSIUM 4.4 05/23/2018    CHLORIDE 106 05/23/2018    CO2 23 05/23/2018    BUN 13 05/23/2018    CR 0.75 05/23/2018    GLC 81 05/23/2018     COAGS:   Lab Results   Component Value Date    PTT 24 04/16/2009    INR 1.06 04/16/2009     POC: No results found for: BGM, HCG, HCGS  HEPATIC:   Lab Results   Component Value Date    ALBUMIN 4.2 09/30/2021    PROTTOTAL 7.7 09/30/2021    ALT 39 09/30/2021    AST 38 09/30/2021    ALKPHOS 80 09/30/2021    BILITOTAL 1.2 (H) 09/30/2021     OTHER:   Lab Results   Component Value Date    ANNELISE 9.5 05/23/2018    TSH 4.73 05/23/2018    SED 9 05/23/2018       Anesthesia Plan    ASA Status:  2      Anesthesia Type: MAC.     - Reason for MAC: straight local not clinically adequate, immobility needed              Consents    Anesthesia Plan(s) and associated risks, benefits, and realistic alternatives discussed. Questions answered and patient/representative(s) expressed understanding.     - Discussed: Risks, Benefits and Alternatives for BOTH SEDATION and the PROCEDURE were discussed     - Discussed with:  Patient      - Extended Intubation/Ventilatory Support Discussed: No.      - Patient is DNR/DNI Status: No    Use of blood products discussed: No .     Postoperative Care    Pain management: IV analgesics, Oral pain medications.        Comments:           H&P reviewed: Unable to attach H&P to encounter due to EHR limitations. H&P Update: appropriate H&P reviewed, patient examined. No interval changes since H&P (within 30 days).         Jacoby Cm MD, MD

## 2022-12-14 ENCOUNTER — OFFICE VISIT (OUTPATIENT)
Dept: OPHTHALMOLOGY | Facility: CLINIC | Age: 57
End: 2022-12-14
Attending: OPHTHALMOLOGY
Payer: COMMERCIAL

## 2022-12-14 DIAGNOSIS — Z48.810 AFTERCARE FOLLOWING SURGERY OF A SENSORY ORGAN: Primary | ICD-10-CM

## 2022-12-14 PROCEDURE — 99024 POSTOP FOLLOW-UP VISIT: CPT | Performed by: OPHTHALMOLOGY

## 2022-12-14 PROCEDURE — G0463 HOSPITAL OUTPT CLINIC VISIT: HCPCS

## 2022-12-14 ASSESSMENT — VISUAL ACUITY
OD_SC+: -3
OS_SC+: -2
OD_SC: 20/20
METHOD: SNELLEN - LINEAR
OS_SC: 20/50

## 2022-12-14 ASSESSMENT — TONOMETRY
OD_IOP_MMHG: 15
IOP_METHOD: TONOPEN
OS_IOP_MMHG: 15

## 2022-12-14 ASSESSMENT — CUP TO DISC RATIO
OD_RATIO: 0.3
OS_RATIO: 0.3

## 2022-12-14 ASSESSMENT — SLIT LAMP EXAM - LIDS
COMMENTS: NORMAL
COMMENTS: NORMAL

## 2022-12-14 ASSESSMENT — EXTERNAL EXAM - RIGHT EYE: OD_EXAM: NORMAL

## 2022-12-14 ASSESSMENT — EXTERNAL EXAM - LEFT EYE: OS_EXAM: NORMAL

## 2022-12-14 NOTE — NURSING NOTE
Chief Complaints and History of Present Illnesses   Patient presents with     Post Op (Ophthalmology) Left Eye     Cataract extraction with IOL in the left eye on 12/13/2022        Chief Complaint(s) and History of Present Illness(es)     Post Op (Ophthalmology) Left Eye            Laterality: left eye    Associated symptoms: redness and tearing.  Negative for eye pain and headache    Pain scale: 0/10    Comments: Cataract extraction with IOL in the left eye on 12/13/2022             Comments    Patient returns to clinic for a one day post operative left eye exam.  Patient denies having any eye discomfort. Patch was removed in office.  Initial vision seems wavy with the left eye.    Justa Heath, COT 9:40 AM  December 14, 2022

## 2022-12-14 NOTE — PROGRESS NOTES
CC: post-op surgery Cataract extraction intraocular lens 12/13/22   Slept well  No eye pain  07/31/2022 status post VITRECTOMY, PARS PLANA APPROACH, USING 25-GAUGE INSTRUMENTS, Scleral Buckle, Endolaser, Gas; left eye   indirect laser ophthalmoscopy right eye   for left RRD and significant lattice burden both eyes    OCT mac 11/17/22  - Right eye: tr ERM, fovea normal, retinal normal-stable  - Left eye: tr ERM, fovea normal, macula now flat. Subretinal round hyper reflective deposits-stable     Optos C/w exam    Intraocular lens calcs 11/17/22 reliable    Assessment  # status post Cataract extraction intraocular lens left eye   POD1  Intraocular lens in good position  Doing well    Plan:  Follow up in one week  Ketorolac (gray top) four times a day    Predforte  (pink top) four times a day  (shake the bottle before)  Ofloxacin (tan top) four times a day    Put the eyedrops 5 minutes a part  Eye shield or glasses at all times x 3 weeks  Sleep with the shield  No heavy lifting   Follow-up in one week  Retina detachment and endophthalmitis precautions were discussed with the patient (increased blurry vision, drainage, new flashes, floaters or a curtain in the visual field) and was asked to return if any of the those occur    # s/p 25gPPV/SBP/EL/SF6/EL left eye and PRP right eye 07/31/2022  - Retina flat and attached 360 both eyes  - IOP good both eyes    ~~~~~~~~~~~~~~~~~~~~~~~~~~~~~~~~~~   Complete documentation of historical and exam elements from today's encounter can be found in the full encounter summary report (not reduplicated in this progress note).  I personally obtained the chief complaint(s) and history of present illness.  I confirmed and edited as necessary the review of systems, past medical/surgical history, family history, social history, and examination findings as documented by others; and I examined the patient myself.  I personally reviewed the relevant tests, images, and reports as documented  above.  I formulated and edited as necessary the assessment and plan and discussed the findings and management plan with the patient and family    Margot Hagan MD  Professor of Ophthalmology  Vitreo-Retinal surgeon   Department of Ophthalmology and Visual Neurosciences   [facility]  Phone: [phone]   Fax: [phone]

## 2022-12-21 ENCOUNTER — OFFICE VISIT (OUTPATIENT)
Dept: OPHTHALMOLOGY | Facility: CLINIC | Age: 57
End: 2022-12-21
Attending: OPHTHALMOLOGY
Payer: COMMERCIAL

## 2022-12-21 DIAGNOSIS — Z48.810 AFTERCARE FOLLOWING SURGERY OF A SENSE ORGAN: ICD-10-CM

## 2022-12-21 PROCEDURE — G0463 HOSPITAL OUTPT CLINIC VISIT: HCPCS

## 2022-12-21 PROCEDURE — 99024 POSTOP FOLLOW-UP VISIT: CPT | Performed by: OPHTHALMOLOGY

## 2022-12-21 RX ORDER — PREDNISOLONE ACETATE 10 MG/ML
1 SUSPENSION/ DROPS OPHTHALMIC 3 TIMES DAILY
Qty: 5 ML | Refills: 3 | Status: SHIPPED | OUTPATIENT
Start: 2022-12-21 | End: 2023-03-30

## 2022-12-21 ASSESSMENT — TONOMETRY
IOP_METHOD: ICARE
OD_IOP_MMHG: 14
OS_IOP_MMHG: 16

## 2022-12-21 ASSESSMENT — CUP TO DISC RATIO
OD_RATIO: 0.3
OS_RATIO: 0.3

## 2022-12-21 ASSESSMENT — VISUAL ACUITY
OS_SC+: -3
OD_SC: 20/20
METHOD: SNELLEN - LINEAR
OS_SC: 20/50
OD_SC+: -3

## 2022-12-21 ASSESSMENT — EXTERNAL EXAM - LEFT EYE: OS_EXAM: NORMAL

## 2022-12-21 ASSESSMENT — SLIT LAMP EXAM - LIDS
COMMENTS: NORMAL
COMMENTS: NORMAL

## 2022-12-21 ASSESSMENT — EXTERNAL EXAM - RIGHT EYE: OD_EXAM: NORMAL

## 2022-12-21 NOTE — PROGRESS NOTES
CC: post-op surgery Cataract extraction intraocular lens left eye 12/13/22   interval: Reports VA improving 20/50  Intraocular lens gin good position   No eye pain  07/31/2022 status post VITRECTOMY, PARS PLANA APPROACH, USING 25-GAUGE INSTRUMENTS, Scleral Buckle, Endolaser, Gas; left eye   indirect laser ophthalmoscopy right eye   for left RRD and significant lattice burden both eyes    OCT mac 11/17/22  - Right eye: tr ERM, fovea normal, retinal normal-stable  - Left eye: tr ERM, fovea normal, macula now flat. Subretinal round hyper reflective deposits-stable     Optos C/w exam    Intraocular lens calcs 11/17/22 reliable    Assessment  # status post Cataract extraction intraocular lens left eye 12.13.2022  POW1  Intraocular lens in good position  Doing well    Plan:  Ketorolac (gray top) Three times a day x 1 week, then twice a day x 1 week and then once a day till finish  Predforte  (pink top) Three times a day x 1 week, then twice a day x 1 week and then once a day till finish   (shake the bottle before)  Ofloxacin (tan top) three times a day x 2 days and stop  Put the eyedrops 5 minutes a part  Eye shield or glasses at all times x 2 weeks  Sleep with the shield  Retina detachment and endophthalmitis precautions were discussed with the patient (increased blurry vision, drainage, new flashes, floaters or a curtain in the visual field) and was asked to return if any of the those occur    # s/p 25gPPV/SBP/EL/SF6/EL left eye and PRP right eye 07/31/2022  - Retina flat and attached 360 both eyes  - IOP good both eyes    PLAN: follow up in 3-4 weeks with prescription, dilation and Optical Coherence Tomography       ~~~~~~~~~~~~~~~~~~~~~~~~~~~~~~~~~~   Complete documentation of historical and exam elements from today's encounter can be found in the full encounter summary report (not reduplicated in this progress note).  I personally obtained the chief complaint(s) and history of present illness.  I confirmed and edited  as necessary the review of systems, past medical/surgical history, family history, social history, and examination findings as documented by others; and I examined the patient myself.  I personally reviewed the relevant tests, images, and reports as documented above.  I formulated and edited as necessary the assessment and plan and discussed the findings and management plan with the patient and family    Margot Hagan MD  Professor of Ophthalmology  Vitreo-Retinal surgeon   Department of Ophthalmology and Visual Neurosciences   [facility]  Phone: [phone]   Fax: [phone]

## 2022-12-21 NOTE — PATIENT INSTRUCTIONS
Ketorolac (gray top) Three times a day x 1 week, then twice a day x 1 week and then once a day till finish  Predforte  (pink top) Three times a day x 1 week, then twice a day x 1 week and then once a day till finish   (shake the bottle before)  Ofloxacin (tan top) three times a day x 2 days and stop  Put the eyedrops 5 minutes a part  Eye shield or glasses at all times x 2 weeks  Sleep with the shield  Retina detachment and endophthalmitis precautions were discussed with the patient (increased blurry vision, drainage, new flashes, floaters or a curtain in the visual field) and was asked to return if any of the those occur

## 2022-12-21 NOTE — NURSING NOTE
Chief Complaints and History of Present Illnesses   Patient presents with     Post Op (Ophthalmology) Left Eye     Cataract extraction with IOL in the left eye on 12/13/2022        Chief Complaint(s) and History of Present Illness(es)     Post Op (Ophthalmology) Left Eye            Laterality: left eye    Associated symptoms: redness (better).  Negative for eye pain, tearing, headache, flashes and floaters    Pain scale: 0/10    Comments: Cataract extraction with IOL in the left eye on 12/13/2022             Comments    Pt here today for 1 week PO KPE c IOL LE  States healing is going well - vision about the same as last week.  Reports following drops regimen as directed.      Elijah Jose COA, MATEUS 9:11 AM 12/21/2022

## 2023-01-04 DIAGNOSIS — H43.393 VITREOUS SYNERESIS OF BOTH EYES: Primary | ICD-10-CM

## 2023-01-11 ENCOUNTER — OFFICE VISIT (OUTPATIENT)
Dept: OPHTHALMOLOGY | Facility: CLINIC | Age: 58
End: 2023-01-11
Attending: OPHTHALMOLOGY
Payer: COMMERCIAL

## 2023-01-11 DIAGNOSIS — H01.01B ULCERATIVE BLEPHARITIS OF UPPER AND LOWER EYELIDS OF BOTH EYES: Primary | ICD-10-CM

## 2023-01-11 DIAGNOSIS — H01.01A ULCERATIVE BLEPHARITIS OF UPPER AND LOWER EYELIDS OF BOTH EYES: Primary | ICD-10-CM

## 2023-01-11 DIAGNOSIS — H43.393 VITREOUS SYNERESIS OF BOTH EYES: ICD-10-CM

## 2023-01-11 PROCEDURE — 99024 POSTOP FOLLOW-UP VISIT: CPT | Mod: GC | Performed by: OPHTHALMOLOGY

## 2023-01-11 PROCEDURE — G0463 HOSPITAL OUTPT CLINIC VISIT: HCPCS | Mod: 25

## 2023-01-11 PROCEDURE — 92015 DETERMINE REFRACTIVE STATE: CPT

## 2023-01-11 RX ORDER — DOXYCYCLINE 50 MG/1
50 TABLET ORAL DAILY
Qty: 90 TABLET | Refills: 3 | Status: SHIPPED | OUTPATIENT
Start: 2023-01-11 | End: 2023-12-06

## 2023-01-11 ASSESSMENT — REFRACTION_MANIFEST
OD_CYLINDER: +0.75
OS_SPHERE: -1.00
OD_ADD: +2.25
OS_CYLINDER: +1.00
OS_AXIS: 010
OD_SPHERE: -0.75
OD_AXIS: 180
OS_ADD: +2.25

## 2023-01-11 ASSESSMENT — CUP TO DISC RATIO
OD_RATIO: 0.3
OS_RATIO: 0.3

## 2023-01-11 ASSESSMENT — REFRACTION_WEARINGRX
OS_AXIS: 170
OD_ADD: +2.00
OS_CYLINDER: +1.00
OD_SPHERE: -1.50
OS_SPHERE: -5.00
OD_AXIS: 090
OS_ADD: +2.00
OD_CYLINDER: +0.75

## 2023-01-11 ASSESSMENT — EXTERNAL EXAM - LEFT EYE: OS_EXAM: NORMAL

## 2023-01-11 ASSESSMENT — VISUAL ACUITY
OS_PH_SC: 20/50
OD_SC+: -1
OS_SC+: -1
METHOD: SNELLEN - LINEAR
OD_SC: 20/25
OS_SC: 20/70

## 2023-01-11 ASSESSMENT — CONF VISUAL FIELD
OS_INFERIOR_TEMPORAL_RESTRICTION: 0
OS_INFERIOR_NASAL_RESTRICTION: 0
OS_SUPERIOR_TEMPORAL_RESTRICTION: 0
OD_SUPERIOR_TEMPORAL_RESTRICTION: 0
OD_INFERIOR_NASAL_RESTRICTION: 0
METHOD: COUNTING FINGERS
OD_INFERIOR_TEMPORAL_RESTRICTION: 0
OD_SUPERIOR_NASAL_RESTRICTION: 0
OS_NORMAL: 1
OS_SUPERIOR_NASAL_RESTRICTION: 0
OD_NORMAL: 1

## 2023-01-11 ASSESSMENT — SLIT LAMP EXAM - LIDS
COMMENTS: NORMAL
COMMENTS: NORMAL

## 2023-01-11 ASSESSMENT — TONOMETRY
IOP_METHOD: APPLANATION
OD_IOP_MMHG: 13
OD_IOP_MMHG: 14
OS_IOP_MMHG: 18
IOP_METHOD: ICARE
OS_IOP_MMHG: 20

## 2023-01-11 ASSESSMENT — EXTERNAL EXAM - RIGHT EYE: OD_EXAM: NORMAL

## 2023-01-11 NOTE — NURSING NOTE
Chief Complaints and History of Present Illnesses   Patient presents with     Post Op (Ophthalmology) Left Eye     Chief Complaint(s) and History of Present Illness(es)     Post Op (Ophthalmology) Left Eye           Comments    POP left eye CE/IOL on 12/13/2022.  The patient notes he has no eye pain.  He wonders how well he will see with glasses with the left eye.  The patient is using Prednisolone twice daily in the left eye.  Chapis Reynolds, COA, COA 10:00 AM 01/11/2023

## 2023-01-11 NOTE — PROGRESS NOTES
CC: post-op surgery Cataract extraction intraocular lens left eye 12/13/22   interval: Reports VA improving 20/50  Intraocular lens gin good position   No eye pain  07/31/2022 status post VITRECTOMY, PARS PLANA APPROACH, USING 25-GAUGE INSTRUMENTS, Scleral Buckle, Endolaser, Gas; left eye   indirect laser ophthalmoscopy right eye   for left RRD and significant lattice burden both eyes    Interval history: he says his vision seems stable. He is done with the ketorolac and the ofloxacin but is still using the prednisolone BID (got it refilled)   Patient reports some metamorphopsia and squiggly vertical lines with amsler    OCT mac 1/11/23  - Right eye: tr ERM, fovea normal, retinal normal - stable  - Left eye: tr ERM, fovea normal, macula now flat. Subretinal round hyper reflective deposits-stable     Optos C/w exam    Intraocular lens calcs 11/17/22 reliable    Assessment  # status post Cataract extraction intraocular lens left eye 12.13.2022  POM1  Intraocular lens in good position  Doing well  MRx dispensed today    Plan:  Ok to resume normal activities    # s/p 25gPPV/SBP/EL/SF6/EL left eye and PRP right eye 07/31/2022  - Retina flat and attached 360 both eyes  - IOP good both eyes    # Blepharitis  - follows with a general ophthalmologist in the community but unable to go see them for refills of doxycycline  - one time refill sent today until able to follow with general ophthalmologist    # Epiretinal membrane left eye   Mild metamorphopsia  Observe for now  Mild Epiretinal membrane right eye - non symptomatic- observe    PLAN: 3 months, DFE, OCT macula, Optos both eyes;   Assess posterior capsular opacity (PCO) and Epiretinal membrane    artificial tears  As needed      Beba Fregoso MD  Ophthalmology Resident, PGY-3  [facility]      ~~~~~~~~~~~~~~~~~~~~~~~~~~~~~~~~~~   Complete documentation of historical and exam elements from today's encounter can be found in the full encounter summary report  (not reduplicated in this progress note).  I personally obtained the chief complaint(s) and history of present illness.  I confirmed and edited as necessary the review of systems, past medical/surgical history, family history, social history, and examination findings as documented by others; and I examined the patient myself.  I personally reviewed the relevant tests, images, and reports as documented above.  I formulated and edited as necessary the assessment and plan and discussed the findings and management plan with the patient and family    Margot Hagan MD  Professor of Ophthalmology  Vitreo-Retinal surgeon   Department of Ophthalmology and Visual Neurosciences   [facility]  Phone: [phone]   Fax: [phone]

## 2023-03-21 DIAGNOSIS — H43.393 VITREOUS SYNERESIS OF BOTH EYES: Primary | ICD-10-CM

## 2023-03-30 ENCOUNTER — OFFICE VISIT (OUTPATIENT)
Dept: OPHTHALMOLOGY | Facility: CLINIC | Age: 58
End: 2023-03-30
Attending: OPHTHALMOLOGY
Payer: COMMERCIAL

## 2023-03-30 DIAGNOSIS — H26.492 PCO (POSTERIOR CAPSULAR OPACIFICATION), LEFT: Primary | ICD-10-CM

## 2023-03-30 DIAGNOSIS — H43.393 VITREOUS SYNERESIS OF BOTH EYES: ICD-10-CM

## 2023-03-30 PROCEDURE — 99207 FUNDUS PHOTOS OU (BOTH EYES): CPT | Mod: 26 | Performed by: OPHTHALMOLOGY

## 2023-03-30 PROCEDURE — 250N000009 HC RX 250: Performed by: OPHTHALMOLOGY

## 2023-03-30 PROCEDURE — G0463 HOSPITAL OUTPT CLINIC VISIT: HCPCS | Mod: 25 | Performed by: OPHTHALMOLOGY

## 2023-03-30 PROCEDURE — 92250 FUNDUS PHOTOGRAPHY W/I&R: CPT | Mod: XU | Performed by: OPHTHALMOLOGY

## 2023-03-30 PROCEDURE — 92014 COMPRE OPH EXAM EST PT 1/>: CPT | Mod: 25 | Performed by: OPHTHALMOLOGY

## 2023-03-30 PROCEDURE — 92134 CPTRZ OPH DX IMG PST SGM RTA: CPT | Performed by: OPHTHALMOLOGY

## 2023-03-30 PROCEDURE — 66821 AFTER CATARACT LASER SURGERY: CPT | Mod: LT | Performed by: OPHTHALMOLOGY

## 2023-03-30 RX ADMIN — APRACLONIDINE HYDROCHLORIDE 1 DROP: 10 SOLUTION/ DROPS OPHTHALMIC at 14:03

## 2023-03-30 ASSESSMENT — CONF VISUAL FIELD
OD_NORMAL: 1
OS_SUPERIOR_NASAL_RESTRICTION: 0
OD_INFERIOR_NASAL_RESTRICTION: 0
OS_INFERIOR_NASAL_RESTRICTION: 0
OS_SUPERIOR_TEMPORAL_RESTRICTION: 0
METHOD: COUNTING FINGERS
OD_SUPERIOR_NASAL_RESTRICTION: 0
OS_INFERIOR_TEMPORAL_RESTRICTION: 0
OD_INFERIOR_TEMPORAL_RESTRICTION: 0
OS_NORMAL: 1
OD_SUPERIOR_TEMPORAL_RESTRICTION: 0

## 2023-03-30 ASSESSMENT — REFRACTION_WEARINGRX
OD_SPHERE: -0.75
OD_AXIS: 003
OS_AXIS: 007
OS_CYLINDER: +1.00
SPECS_TYPE: PAL
OS_SPHERE: -1.00
OD_CYLINDER: +0.75
OS_ADD: +2.18
OD_ADD: +2.17

## 2023-03-30 ASSESSMENT — TONOMETRY
IOP_METHOD: TONOPEN
IOP_METHOD: TONOPEN
OD_IOP_MMHG: 20
OS_IOP_MMHG: 13
OD_IOP_MMHG: 18
OS_IOP_MMHG: 13

## 2023-03-30 ASSESSMENT — EXTERNAL EXAM - RIGHT EYE: OD_EXAM: NORMAL

## 2023-03-30 ASSESSMENT — EXTERNAL EXAM - LEFT EYE: OS_EXAM: NORMAL

## 2023-03-30 ASSESSMENT — CUP TO DISC RATIO
OD_RATIO: 0.3
OS_RATIO: 0.3

## 2023-03-30 ASSESSMENT — VISUAL ACUITY
CORRECTION_TYPE: GLASSES
OD_CC: 20/20-3
METHOD: SNELLEN - LINEAR

## 2023-03-30 ASSESSMENT — REFRACTION_MANIFEST
OS_CYLINDER: +0.50
OS_SPHERE: -1.25
OS_AXIS: 005

## 2023-03-30 ASSESSMENT — SLIT LAMP EXAM - LIDS
COMMENTS: NORMAL
COMMENTS: NORMAL

## 2023-03-30 NOTE — PROGRESS NOTES
CC: post-op surgery Cataract extraction intraocular lens left eye 12/13/22   interval: Reports VA improving 20/50  Intraocular lens gin good position   No eye pain  07/31/2022 status post VITRECTOMY, PARS PLANA APPROACH, USING 25-GAUGE INSTRUMENTS, Scleral Buckle, Endolaser, Gas; left eye   indirect laser ophthalmoscopy right eye   for left RRD and significant lattice burden both eyes    Interval history: he says his vision seems stable. He is done with the ketorolac and the ofloxacin but is still using the prednisolone BID (got it refilled)   Patient reports some metamorphopsia and squiggly vertical lines with amsler    OCT mac 1/11/23  - Right eye: tr ERM, fovea normal, retinal normal - stable  - Left eye: tr ERM, fovea normal, macula now flat. Subretinal round hyper reflective deposits-stable     Optos C/w exam    Intraocular lens calcs 11/17/22 reliable    Assessment  # status post Cataract extraction intraocular lens left eye 12.13.2022  POM3  Intraocular lens in good position with posterior capsular opacity (PCO)   Recommend yag left eye   Risks, benefits and alternatives discussed with patient and agreed to proceed  No complications     Plan:  Follow up 1-2 weeks with prescription and MARLENY and dilation left eye     # s/p 25gPPV/SBP/EL/SF6/EL left eye and PRP right eye 07/31/2022  - Retina flat and attached 360 both eyes  - IOP good both eyes    # Blepharitis  - follows with a general ophthalmologist in the community but unable to go see them for refills of doxycycline  - one time refill sent today until able to follow with general ophthalmologist    # Epiretinal membrane left eye   Patient reports some metamorphopsia  Observe for now  Consider Pars plana vitrectomy (PPV) and membrane peel if worsening symptoms  artificial tears  As needed      ~~~~~~~~~~~~~~~~~~~~~~~~~~~~~~~~~~   Complete documentation of historical and exam elements from today's encounter can be found in the full encounter summary report (not  reduplicated in this progress note).  I personally obtained the chief complaint(s) and history of present illness.  I confirmed and edited as necessary the review of systems, past medical/surgical history, family history, social history, and examination findings as documented by others; and I examined the patient myself.  I personally reviewed the relevant tests, images, and reports as documented above.  I formulated and edited as necessary the assessment and plan and discussed the findings and management plan with the patient and family and No resident or fellow assisted with the procedures performed.  I performed the procedures myself.    Margot Hagan MD  Professor of Ophthalmology  Vitreo-Retinal surgeon   Department of Ophthalmology and Visual Neurosciences   [facility]  Phone: [phone]   Fax: [phone]

## 2023-04-10 ENCOUNTER — OFFICE VISIT (OUTPATIENT)
Dept: OPHTHALMOLOGY | Facility: CLINIC | Age: 58
End: 2023-04-10
Attending: OPHTHALMOLOGY
Payer: COMMERCIAL

## 2023-04-10 DIAGNOSIS — Z48.810 AFTERCARE FOLLOWING SURGERY OF A SENSORY ORGAN: Primary | ICD-10-CM

## 2023-04-10 PROCEDURE — 99024 POSTOP FOLLOW-UP VISIT: CPT | Mod: GC | Performed by: OPHTHALMOLOGY

## 2023-04-10 PROCEDURE — G0463 HOSPITAL OUTPT CLINIC VISIT: HCPCS | Performed by: OPHTHALMOLOGY

## 2023-04-10 ASSESSMENT — TONOMETRY
OS_IOP_MMHG: 11
IOP_METHOD: TONOPEN
OD_IOP_MMHG: 15

## 2023-04-10 ASSESSMENT — REFRACTION_MANIFEST
OS_ADD: +2.50
OS_SPHERE: -1.00
OS_CYLINDER: SPHERE

## 2023-04-10 ASSESSMENT — VISUAL ACUITY
METHOD: SNELLEN - LINEAR
OS_CC: 20/60
OS_CC+: -1
OD_CC: 20/25
CORRECTION_TYPE: GLASSES

## 2023-04-10 ASSESSMENT — SLIT LAMP EXAM - LIDS
COMMENTS: NORMAL
COMMENTS: NORMAL

## 2023-04-10 ASSESSMENT — REFRACTION_WEARINGRX
OD_SPHERE: -0.75
SPECS_TYPE: PAL
OD_ADD: +2.17
OD_AXIS: 003
OS_CYLINDER: +1.00
OS_SPHERE: -1.00
OD_CYLINDER: +0.75
OS_AXIS: 007
OS_ADD: +2.18

## 2023-04-10 ASSESSMENT — CUP TO DISC RATIO
OS_RATIO: 0.3
OD_RATIO: 0.3

## 2023-04-10 ASSESSMENT — EXTERNAL EXAM - LEFT EYE: OS_EXAM: NORMAL

## 2023-04-10 ASSESSMENT — EXTERNAL EXAM - RIGHT EYE: OD_EXAM: NORMAL

## 2023-04-10 NOTE — NURSING NOTE
Chief Complaints and History of Present Illnesses   Patient presents with     Post Op (Ophthalmology) Left Eye     Chief Complaint(s) and History of Present Illness(es)     Post Op (Ophthalmology) Left Eye            Laterality: left eye    Onset: 2 weeks ago          Comments    2 week s/p yap cap LE-Pt. States that he isn't noticing much change in VA LE since laser. Was seeing some floaters LE after laser but has since resolved. No pain BE. Some dryness LE.   Betsey Nobles COT 10:35 AM April 10, 2023

## 2023-04-10 NOTE — PROGRESS NOTES
CC: post-op surgery Cataract extraction intraocular lens left eye 12/13/22   interval: 2 week s/p yap cap LE-Pt. States that he isn't noticing much change in VA LE since laser. Was seeing some floaters LE after laser but has since resolved. No pain BE. Some dryness LE.   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 He is done with the ketorolac and the ofloxacin but is still using the prednisolone BID (got it refilled)   Patient reports some metamorphopsia and squiggly vertical lines with amsler    OCT mac 1/11/23  - Right eye: tr ERM, fovea normal, retinal normal - stable  - Left eye: tr ERM, fovea normal, macula now flat. Subretinal round hyper reflective deposits-stable     Optos C/w exam    Intraocular lens calcs 11/17/22 reliable    Assessment  # status post Cataract extraction intraocular lens left eye 12.13.2022  POM3  Intraocular lens in good position with posterior capsular opacity (PCO)   S/p YAG Capsulotomy 3/30/23  Doing well    # s/p 25gPPV/SBP/EL/SF6/EL left eye and PRP right eye 07/31/2022  - Retina flat and attached 360 both eyes  - IOP good both eyes    # Blepharitis  - follows with a general ophthalmologist in the community but unable to go see them for refills of doxycycline  - one time refill sent today until able to follow with general ophthalmologist    # Epiretinal membrane left eye   BCVA on MRx 20/30 & MARLENY 20/30 04/10/23  Patient reports some metamorphopsia  Consider Pars plana vitrectomy (PPV) and membrane peel if worsening symptoms  artificial tears  As needed      RTC: 6 months OCT macula; DFE both eyes  Prescription given 04/10/23     Avery Corley MD MPH  Vitreoretinal Fellow PGY-5  [facility]  VICKY Probe Inserted       ~~~~~~~~~~~~~~~~~~~~~~~~~~~~~~~~~~   Complete documentation of historical and exam elements from today's encounter can be found in the full encounter summary report (not reduplicated in this progress note).  I personally obtained the chief complaint(s) and history of present illness.  I confirmed and edited as necessary the review of systems, past medical/surgical history, family history, social history, and examination findings as documented by others; and I examined the patient myself.  I personally reviewed the relevant tests, images, and reports as documented above.  I formulated and edited as necessary the assessment and plan and discussed the findings and management plan with the patient and family and No resident or fellow assisted with the procedures performed.  I performed the procedures myself.    Margot Hagan MD  Professor of Ophthalmology  Vitreo-Retinal surgeon   Department of Ophthalmology and Visual Neurosciences   [facility]  Phone: [phone]   Fax: [phone]

## 2023-09-28 DIAGNOSIS — H43.393 VITREOUS SYNERESIS OF BOTH EYES: Primary | ICD-10-CM

## 2023-11-30 DIAGNOSIS — E78.2 MIXED HYPERLIPIDEMIA: ICD-10-CM

## 2023-11-30 RX ORDER — ATORVASTATIN CALCIUM 10 MG/1
10 TABLET, FILM COATED ORAL DAILY
Qty: 90 TABLET | Refills: 0 | Status: SHIPPED | OUTPATIENT
Start: 2023-11-30 | End: 2024-03-12

## 2023-11-30 NOTE — TELEPHONE ENCOUNTER
Pepper refill given x 1, needs in clinic appointment/blood work for further refills.   Prescription approved per [facility] Refill Protocol.  Julie Behrendt RN

## 2023-12-06 ENCOUNTER — OFFICE VISIT (OUTPATIENT)
Dept: OPHTHALMOLOGY | Facility: CLINIC | Age: 58
End: 2023-12-06
Attending: OPHTHALMOLOGY
Payer: COMMERCIAL

## 2023-12-06 DIAGNOSIS — H01.01A ULCERATIVE BLEPHARITIS OF UPPER AND LOWER EYELIDS OF BOTH EYES: ICD-10-CM

## 2023-12-06 DIAGNOSIS — H01.01B ULCERATIVE BLEPHARITIS OF UPPER AND LOWER EYELIDS OF BOTH EYES: ICD-10-CM

## 2023-12-06 DIAGNOSIS — H43.393 VITREOUS SYNERESIS OF BOTH EYES: ICD-10-CM

## 2023-12-06 PROCEDURE — 92134 CPTRZ OPH DX IMG PST SGM RTA: CPT | Performed by: OPHTHALMOLOGY

## 2023-12-06 PROCEDURE — 99214 OFFICE O/P EST MOD 30 MIN: CPT | Performed by: OPHTHALMOLOGY

## 2023-12-06 RX ORDER — DOXYCYCLINE 50 MG/1
50 TABLET ORAL DAILY
Qty: 90 TABLET | Refills: 3 | Status: SHIPPED | OUTPATIENT
Start: 2023-12-06

## 2023-12-06 ASSESSMENT — VISUAL ACUITY
OS_CC+: +2
OD_PH_CC: 20/25
OS_CC: 20/60
OD_CC: 20/30
OD_PH_CC+: -2
OD_CC+: -1
CORRECTION_TYPE: GLASSES
METHOD: SNELLEN - LINEAR

## 2023-12-06 ASSESSMENT — CUP TO DISC RATIO
OS_RATIO: 0.3
OD_RATIO: 0.3

## 2023-12-06 ASSESSMENT — TONOMETRY
OD_IOP_MMHG: 17
IOP_METHOD: TONOPEN
OS_IOP_MMHG: 12

## 2023-12-06 ASSESSMENT — SLIT LAMP EXAM - LIDS
COMMENTS: NORMAL
COMMENTS: NORMAL

## 2023-12-06 ASSESSMENT — EXTERNAL EXAM - RIGHT EYE: OD_EXAM: NORMAL

## 2023-12-06 ASSESSMENT — EXTERNAL EXAM - LEFT EYE: OS_EXAM: NORMAL

## 2023-12-06 NOTE — NURSING NOTE
"Chief Complaints and History of Present Illnesses   Patient presents with    Follow Up     Vitreous syneresis of both eyes     Chief Complaint(s) and History of Present Illness(es)       Follow Up              Comments: Vitreous syneresis of both eyes              Comments    Pt states that \"the left eye gets tired and tears after driving more than 6 hours\"  Occ floaters in the left eye , not new  No flashes, eye pain or tearing     Yennifer Cabral COT 10:24 AM December 6, 2023                        "

## 2023-12-06 NOTE — PROGRESS NOTES
CC: follow up Epiretinal membrane both eyes   Cataract extraction intraocular lens left eye 12/13/22   interval: noticed mild distortion of the vision when driving. Drove back from [place] and felt left eye got tired and tearing. No pain BE. Some dryness LE.     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 Subretinal round hyper reflective deposits-stable     Assessment    # pseudophakia left eye 12.13.2022  S/p YAG Capsulotomy 3/30/23  Axis clear; intraocular lens in good position  Prescription given 04/10/23   Observe    # s/p 25gPPV/SBP/EL/SF6/EL left eye 07/31/2022  For Retinal detachment left eye   - Retina flat and attached 360 both eyes  - IOP good both eyes    # lattice degeneration right eye  Status post laser retinopexy  07/31/2022  Retina attached    # Blepharitis and Dry eye syndrome   History of rosacea  - follows with a general ophthalmologist in the community but unable to go see them for refills of doxycycline  - one time refill sent today until able to follow with general ophthalmologist    # Epiretinal membrane left eye   BCVA on MRx 20/30 & MARLENY 20/30 04/10/23  Patient reports mild metamorphopsia  Consider Pars plana vitrectomy (PPV) and membrane peel if worsening symptoms  artificial tears  As needed      RTC: 4 months OCT macula; DFE both eyes and prescription; MARLENY left eye     ~~~~~~~~~~~~   Complete documentation of historical and exam elements from today's encounter can be found in the full encounter summary report (not reduplicated in this progress note).  I personally obtained the chief complaint(s) and history of present illness.  I confirmed and edited as necessary the review of systems, past medical/surgical history, family history,  social history, and examination findings as documented by others; and I examined the patient myself.  I personally reviewed the relevant tests, images, and reports as documented above.  I formulated and edited as necessary the assessment and plan and discussed the findings and management plan with the patient and family    Margot Hagan MD  Professor of Ophthalmology  Vitreo-Retinal surgeon   Department of Ophthalmology and Visual Neurosciences   [facility]  Phone: [phone]   Fax: [phone]

## 2024-01-13 ENCOUNTER — HEALTH MAINTENANCE LETTER (OUTPATIENT)
Age: 59
End: 2024-01-13

## 2024-02-05 DIAGNOSIS — E78.2 MIXED HYPERLIPIDEMIA: ICD-10-CM

## 2024-02-06 RX ORDER — ATORVASTATIN CALCIUM 10 MG/1
10 TABLET, FILM COATED ORAL DAILY
Qty: 90 TABLET | Refills: 3 | OUTPATIENT
Start: 2024-02-06

## 2024-02-06 NOTE — TELEPHONE ENCOUNTER
Refused  Last caterina 11/2022 for pre-op. Never established care.  Needs appt for refills ASAP  Thank you,  Fabricio Cannon MD

## 2024-02-06 NOTE — TELEPHONE ENCOUNTER
LM on patient VM., rx refused, patient is dur for OV and needs to establish care. Rx refillsed when patient makes appt. Lorenza Tsang on 2/6/2024 at 2:07 PM

## 2024-03-11 DIAGNOSIS — E78.2 MIXED HYPERLIPIDEMIA: ICD-10-CM

## 2024-03-11 NOTE — TELEPHONE ENCOUNTER
Medication Question or Refill        What medication are you calling about (include dose and sig)?: atorvastatin (LIPITOR) 10 MG tablet    Preferred Pharmacy:   [facility] ([facility] - [address]  [address]  [facility] [address]  Phone: [phone] Fax: [phone]          Controlled Substance Agreement on file:   CSA -- Patient Level:    CSA: None found at the patient level.       Who prescribed the medication?: Fabricio Cannon MD    Do you need a refill? Yes    When did you use the medication last? 03/11/ 2024    Patient offered an appointment? Yes:     5/21/2024       Do you have any questions or concerns?  Yes: wants to know if he needs to take amoxicillin before dentist appt thats for 04/09/2024        [facility] or would you prefer to receive a phone call?:   Patient would prefer a phone call   Okay to leave a detailed message?: Yes at Cell number on file:    Telephone Information:   Mobile [phone]

## 2024-03-14 RX ORDER — ATORVASTATIN CALCIUM 10 MG/1
10 TABLET, FILM COATED ORAL DAILY
Qty: 90 TABLET | Refills: 0 | Status: SHIPPED | OUTPATIENT
Start: 2024-03-14 | End: 2024-05-21

## 2024-03-14 NOTE — TELEPHONE ENCOUNTER
Covering for PCP:    The guidelines for who needs antibiotics prior to routine dental work have changed in the last several years.  Many people were being over treated with antibiotics that did not need them.  The current guidelines state that only people with the following heart conditions need antibiotics prior to routine dental work.  Person with:  --prosthetic heart valves  --prior history of an infected heart valve, called endocarditis  --an unrepaired cyanotic congential heart disease - person would be following very closely with cardiology and would know they have this  --congential heart defect with prosthetic material, repaired in the last 6 months  --incompletely repaired congential heart defect with prosthetic material  --valve leak in a transplanted heart.  In addition, dental procedures are not associated with an increased risk of prosthetic joint infection, and use of routine antibiotic prophylaxis prior to dental procedures does not alter the risk of subsequent total hip or knee infection.    On chart review of this patient, I do not see that they have any of the above conditions, and therefore I do not recommend antibiotic prior to dental work.

## 2024-03-28 ENCOUNTER — TRANSFERRED RECORDS (OUTPATIENT)
Dept: HEALTH INFORMATION MANAGEMENT | Facility: CLINIC | Age: 59
End: 2024-03-28
Payer: COMMERCIAL

## 2024-04-09 ENCOUNTER — TRANSFERRED RECORDS (OUTPATIENT)
Dept: HEALTH INFORMATION MANAGEMENT | Facility: CLINIC | Age: 59
End: 2024-04-09
Payer: COMMERCIAL

## 2024-04-15 ENCOUNTER — TRANSFERRED RECORDS (OUTPATIENT)
Dept: HEALTH INFORMATION MANAGEMENT | Facility: CLINIC | Age: 59
End: 2024-04-15
Payer: COMMERCIAL

## 2024-04-16 DIAGNOSIS — H43.393 VITREOUS SYNERESIS OF BOTH EYES: Primary | ICD-10-CM

## 2024-05-02 ENCOUNTER — OFFICE VISIT (OUTPATIENT)
Dept: OPHTHALMOLOGY | Facility: CLINIC | Age: 59
End: 2024-05-02
Attending: OPHTHALMOLOGY
Payer: COMMERCIAL

## 2024-05-02 DIAGNOSIS — H43.393 VITREOUS SYNERESIS OF BOTH EYES: ICD-10-CM

## 2024-05-02 PROCEDURE — 92134 CPTRZ OPH DX IMG PST SGM RTA: CPT | Performed by: OPHTHALMOLOGY

## 2024-05-02 PROCEDURE — 99213 OFFICE O/P EST LOW 20 MIN: CPT | Performed by: OPHTHALMOLOGY

## 2024-05-02 PROCEDURE — 99214 OFFICE O/P EST MOD 30 MIN: CPT | Performed by: OPHTHALMOLOGY

## 2024-05-02 ASSESSMENT — SLIT LAMP EXAM - LIDS
COMMENTS: NORMAL
COMMENTS: NORMAL

## 2024-05-02 ASSESSMENT — CUP TO DISC RATIO
OD_RATIO: 0.3
OS_RATIO: 0.3

## 2024-05-02 ASSESSMENT — REFRACTION_WEARINGRX
OD_CYLINDER: +0.75
OS_CYLINDER: +1.00
OD_AXIS: 003
OS_SPHERE: -1.00
OD_ADD: +2.17
SPECS_TYPE: PAL
OS_AXIS: 007
OD_SPHERE: -0.75
OS_ADD: +2.18

## 2024-05-02 ASSESSMENT — VISUAL ACUITY
CORRECTION_TYPE: GLASSES
OS_BAT_MED: 20/70
OS_CC: 20/50
OS_BAT_LOW: 20/60
OD_CC: 20/25
METHOD: SNELLEN - LINEAR
OS_BAT_HIGH: 20/80

## 2024-05-02 ASSESSMENT — TONOMETRY
OS_IOP_MMHG: 15
IOP_METHOD: TONOPEN
OD_IOP_MMHG: 16

## 2024-05-02 ASSESSMENT — EXTERNAL EXAM - RIGHT EYE: OD_EXAM: NORMAL

## 2024-05-02 ASSESSMENT — EXTERNAL EXAM - LEFT EYE: OS_EXAM: NORMAL

## 2024-05-02 NOTE — PROGRESS NOTES
CC: follow up Epiretinal membrane both eyes   Cataract extraction intraocular lens left eye 12/13/22   interval: noticed mild distortion of the vision when driving. Drove back from [place] and felt left eye got tired and tearing. No pain BE. Some dryness LE.     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 stable    Assessment    # pseudophakia left eye 12.13.2022  S/p YAG Capsulotomy 3/30/23; small opening and patient with some glare  Can consider additional yag   Axis clear; intraocular lens in good position  Prescription given 04/10/23   Observe    # cataract right eye   Not visually significant     # s/p 25gPPV/SBP/EL/SF6/EL left eye 07/31/2022  For Retinal detachment left eye   - Retina flat and attached 360 both eyes  Retina detachment precautions were discussed with the patient (presence or increased in flashes, floaters or a curtain in the visual field) and was asked to return if any of the those occur     # lattice degeneration right eye  Status post laser retinopexy  07/31/2022  Retina attached    # Blepharitis and Dry eye syndrome   History of rosacea  - follows with a general ophthalmologist in the community but unable to go see them for refills of doxycycline  - one time refill sent today until able to follow with general ophthalmologist    # Epiretinal membrane both eyes   Left eye > right eye  With mild metamorphopsia left eye      BCVA on MRx 20/30 & MARLENY 20/30 04/10/23  Patient reports mild metamorphopsia  Consider Pars plana vitrectomy (PPV) and membrane peel if worsening symptoms  artificial tears  As needed      RTC: 4 months OCT macula; DFE both eyes ; MARLENY left eye ; BAT both eyes   Possible yag laser left eye     ~~~~~~~~~~~~    Complete documentation of historical and exam elements from today's encounter can be found in the full encounter summary report (not reduplicated in this progress note).  I personally obtained the chief complaint(s) and history of present illness.  I confirmed and edited as necessary the review of systems, past medical/surgical history, family history, social history, and examination findings as documented by others; and I examined the patient myself.  I personally reviewed the relevant tests, images, and reports as documented above.  I formulated and edited as necessary the assessment and plan and discussed the findings and management plan with the patient and family    Margot Hagan MD  Professor of Ophthalmology  Vitreo-Retinal surgeon   Department of Ophthalmology and Visual Neurosciences   [facility]  Phone: [phone]   Fax: [phone]

## 2024-05-02 NOTE — NURSING NOTE
Chief Complaints and History of Present Illnesses   Patient presents with    Retinal Evaluation     Chief Complaint(s) and History of Present Illness(es)       Retinal Evaluation              Laterality: both eyes    Onset: gradual    Onset: months ago    Quality: States the va is ok.  States the va in the left eye is blurry or a film over the va    Associated symptoms: photophobia and floaters.  Negative for glare and flashes    Treatments tried: artificial tears    Pain scale: 0/10              Comments    Here for vitreous syneresis of both eyes   AT ana maria Meng COT 10:21 AM May 2, 2024

## 2024-05-20 DIAGNOSIS — E78.2 MIXED HYPERLIPIDEMIA: ICD-10-CM

## 2024-05-21 ENCOUNTER — OFFICE VISIT (OUTPATIENT)
Dept: FAMILY MEDICINE | Facility: CLINIC | Age: 59
End: 2024-05-21
Payer: COMMERCIAL

## 2024-05-21 VITALS
DIASTOLIC BLOOD PRESSURE: 82 MMHG | RESPIRATION RATE: 20 BRPM | HEIGHT: 68 IN | OXYGEN SATURATION: 94 % | HEART RATE: 78 BPM | WEIGHT: 209.2 LBS | BODY MASS INDEX: 31.71 KG/M2 | TEMPERATURE: 97.7 F | SYSTOLIC BLOOD PRESSURE: 138 MMHG

## 2024-05-21 DIAGNOSIS — Z86.79 HISTORY OF CARDIOMYOPATHY: ICD-10-CM

## 2024-05-21 DIAGNOSIS — Z13.220 SCREENING FOR LIPID DISORDERS: ICD-10-CM

## 2024-05-21 DIAGNOSIS — E78.2 MIXED HYPERLIPIDEMIA: ICD-10-CM

## 2024-05-21 DIAGNOSIS — Z86.79 HISTORY OF ATRIAL FIBRILLATION: ICD-10-CM

## 2024-05-21 DIAGNOSIS — Z00.00 ROUTINE GENERAL MEDICAL EXAMINATION AT A HEALTH CARE FACILITY: Primary | ICD-10-CM

## 2024-05-21 DIAGNOSIS — Z12.5 SCREENING PSA (PROSTATE SPECIFIC ANTIGEN): ICD-10-CM

## 2024-05-21 PROBLEM — I48.0 PAROXYSMAL ATRIAL FIBRILLATION (H): Status: ACTIVE | Noted: 2024-05-21

## 2024-05-21 PROBLEM — M51.369 DDD (DEGENERATIVE DISC DISEASE), LUMBAR: Status: ACTIVE | Noted: 2024-05-21

## 2024-05-21 LAB
ALBUMIN SERPL BCG-MCNC: 4.2 G/DL (ref 3.5–5.2)
ALP SERPL-CCNC: 84 U/L (ref 40–150)
ALT SERPL W P-5'-P-CCNC: 46 U/L (ref 0–70)
ANION GAP SERPL CALCULATED.3IONS-SCNC: 11 MMOL/L (ref 7–15)
AST SERPL W P-5'-P-CCNC: 40 U/L (ref 0–45)
BILIRUB SERPL-MCNC: 0.8 MG/DL
BUN SERPL-MCNC: 12.4 MG/DL (ref 8–23)
CALCIUM SERPL-MCNC: 9.4 MG/DL (ref 8.8–10.2)
CHLORIDE SERPL-SCNC: 103 MMOL/L (ref 98–107)
CHOLEST SERPL-MCNC: 166 MG/DL
CREAT SERPL-MCNC: 0.7 MG/DL (ref 0.67–1.17)
DEPRECATED HCO3 PLAS-SCNC: 27 MMOL/L (ref 22–29)
EGFRCR SERPLBLD CKD-EPI 2021: >90 ML/MIN/1.73M2
FASTING STATUS PATIENT QL REPORTED: YES
FASTING STATUS PATIENT QL REPORTED: YES
GLUCOSE SERPL-MCNC: 100 MG/DL (ref 70–99)
HDLC SERPL-MCNC: 46 MG/DL
LDLC SERPL CALC-MCNC: 93 MG/DL
NONHDLC SERPL-MCNC: 120 MG/DL
POTASSIUM SERPL-SCNC: 4.1 MMOL/L (ref 3.4–5.3)
PROT SERPL-MCNC: 7.7 G/DL (ref 6.4–8.3)
PSA SERPL DL<=0.01 NG/ML-MCNC: 0.72 NG/ML (ref 0–4.5)
SODIUM SERPL-SCNC: 141 MMOL/L (ref 135–145)
TRIGL SERPL-MCNC: 134 MG/DL

## 2024-05-21 PROCEDURE — 80061 LIPID PANEL: CPT | Performed by: FAMILY MEDICINE

## 2024-05-21 PROCEDURE — 99396 PREV VISIT EST AGE 40-64: CPT | Performed by: FAMILY MEDICINE

## 2024-05-21 PROCEDURE — 99213 OFFICE O/P EST LOW 20 MIN: CPT | Mod: 25 | Performed by: FAMILY MEDICINE

## 2024-05-21 PROCEDURE — G0103 PSA SCREENING: HCPCS | Performed by: FAMILY MEDICINE

## 2024-05-21 PROCEDURE — 80053 COMPREHEN METABOLIC PANEL: CPT | Performed by: FAMILY MEDICINE

## 2024-05-21 PROCEDURE — 36415 COLL VENOUS BLD VENIPUNCTURE: CPT | Performed by: FAMILY MEDICINE

## 2024-05-21 RX ORDER — ATORVASTATIN CALCIUM 10 MG/1
10 TABLET, FILM COATED ORAL DAILY
Qty: 90 TABLET | Refills: 3 | Status: SHIPPED | OUTPATIENT
Start: 2024-05-21

## 2024-05-21 RX ORDER — ATORVASTATIN CALCIUM 10 MG/1
10 TABLET, FILM COATED ORAL DAILY
Qty: 90 TABLET | Refills: 3 | OUTPATIENT
Start: 2024-05-21

## 2024-05-21 SDOH — HEALTH STABILITY: PHYSICAL HEALTH: ON AVERAGE, HOW MANY MINUTES DO YOU ENGAGE IN EXERCISE AT THIS LEVEL?: 20 MIN

## 2024-05-21 SDOH — HEALTH STABILITY: PHYSICAL HEALTH: ON AVERAGE, HOW MANY DAYS PER WEEK DO YOU ENGAGE IN MODERATE TO STRENUOUS EXERCISE (LIKE A BRISK WALK)?: 4 DAYS

## 2024-05-21 ASSESSMENT — SOCIAL DETERMINANTS OF HEALTH (SDOH): HOW OFTEN DO YOU GET TOGETHER WITH FRIENDS OR RELATIVES?: TWICE A WEEK

## 2024-05-21 ASSESSMENT — PAIN SCALES - GENERAL: PAINLEVEL: MODERATE PAIN (5)

## 2024-05-21 NOTE — PATIENT INSTRUCTIONS
Goal BP <140/<90 always, ideal <130/<80  -Check blood pressures a few times per week and write them down and bring them with to next clinic visit.    -Schedule a nurse BP recheck appt in 3 weeks and bring you home cuff and numbers along to compare.  -Low salt diet 1500mg per day. DASH diet has sample menu plans.  -Labs once per year to check kidneys and electrolytes.  -Regular moderate intensity exercise.    This is a preventative visit and any additional concerns or chronic disease management including medication refills addressed today could be charged additionally.    Preventative visits screen for diseases prior to they occur.  They do not cover for any new diagnosis or chronic disease management.     If you have questions regarding your coverage please check with your insurance provider.  At [facility] we need to code correctly to be in compliance with all insurance companies.    Eating Healthy Foods: Care Instructions  With every meal, you can make healthy food choices. Try to eat a variety of fruits, vegetables, whole grains, lean proteins, and low-fat dairy products. This can help you get the right balance of nutrients, including vitamins and minerals. Small changes add up over time. You can start by adding one healthy food to your meals each day.    Try to make half your plate fruits and vegetables, one-fourth whole grains, and one-fourth lean proteins. Try including dairy with your meals.   Eat more fruits and vegetables. Try to have them with most meals and snacks.   Foods for healthy eating    Fruits    These can be fresh, frozen, canned, or dried.  Try to choose whole fruit rather than fruit juice.  Eat a variety of colors.    Vegetables    These can be fresh, frozen, canned, or dried.  Beans, peas, and lentils count too.    Whole grains    Choose whole-grain breads, cereals, and noodles.  Try brown rice.    Lean proteins    These can include lean meat, poultry, fish, and eggs.  You can also have tofu,  "beans, peas, lentils, nuts, and seeds.    Dairy    Try milk, yogurt, and cheese.  Choose low-fat or fat-free when you can.  If you need to, use lactose-free milk or fortified plant-based milk products, such as soy milk.    Water    Drink water when you're thirsty.  Limit sugar-sweetened drinks, including soda, fruit drinks, and sports drinks.  Where can you learn more?  Go to https://www.GoWorkaBit.net/patiented  Enter T756 in the search box to learn more about \"Eating Healthy Foods: Care Instructions.\"  Current as of: September 20, 2023               Content Version: 14.0    5803-5940 [employer].   Care instructions adapted under license by your healthcare professional. If you have questions about a medical condition or this instruction, always ask your healthcare professional. [employer] disclaims any warranty or liability for your use of this information.      Prostate Cancer Screening: Care Instructions  Overview     Prostate cancer is the abnormal growth of cells in the prostate. The prostate is part of the male reproductive system. It is a small organ below the bladder that makes fluid for semen.  Screening can help find prostate cancer early. When it's found and treated early, the cancer may be cured. But it's not always treated. That's because the treatments can cause serious side effects. In most cases, prostate cancer isn't life-threatening. This is especially true in someone who is older and when the cancer grows slowly.  Prostate cancer is a common type of cancer. Most cases occur after age 65. The disease runs in families. And it's more common in  Americans.  Follow-up care is a key part of your treatment and safety. Be sure to make and go to all appointments, and call your doctor if you are having problems. It's also a good idea to know your test results and keep a list of the medicines you take.  What is the screening test for prostate cancer?  The main screening " "test for prostate cancer is the prostate-specific antigen (PSA) test. This is a blood test that measures how much PSA is in your blood. A high level may mean that you have an enlarged prostate, an infection, or cancer.  Along with the PSA test, you may have a digital (finger) rectal exam. This exam checks for anything abnormal in your prostate. To do the exam, the doctor puts a lubricated, gloved finger into your rectum.  If these tests suggest cancer, you may need a prostate biopsy to see if there are cancer cells in the prostate.  What are the pros and cons of screening?  Neither a PSA test nor a digital rectal exam can tell you for sure that you do or do not have cancer. But they can help you decide if you need more tests, such as a prostate biopsy. Screening tests may be useful because prostate cancer often doesn't cause symptoms. It can be hard to know if you have cancer until it's more advanced. And then it's harder to treat.  But having a PSA test can also cause harm. The test may show high levels of PSA that aren't caused by cancer. So you could have a prostate biopsy you didn't need. Or the PSA test might be normal when there is cancer, so a cancer might not be found early. The test can also find cancers that would never have caused a problem during your lifetime. So you might have treatment that wasn't needed.  Prostate cancer usually develops late in life and grows slowly. In most cases, it doesn't shorten lives.  Talk with your doctor to see if screening is right for you.  Where can you learn more?  Go to https://www.Matchalarm.net/patiented  Enter R550 in the search box to learn more about \"Prostate Cancer Screening: Care Instructions.\"  Current as of: October 25, 2023               Content Version: 14.0    0399-3570 [employer].   Care instructions adapted under license by your healthcare professional. If you have questions about a medical condition or this instruction, always ask your " "healthcare professional. [employer] [facility] disclaims any warranty or liability for your use of this information.      Preventive Care Advice   This is general advice we often give to help people stay healthy. Your care team may have specific advice just for you. Please talk to your care team about your own preventive care needs.  Lifestyle  Exercise at least 150 minutes each week (30 minutes a day, 5 days a week).  Do muscle strengthening activities 2 days a week. These help control your weight and prevent disease.  No smoking.  Wear sunscreen to prevent skin cancer.  Have your home tested for radon every 2 to 5 years. Radon is a colorless, odorless gas that can harm your lungs. To learn more, go to www.health.[facility].mn.us and search for \"Radon in Homes.\"  Keep guns unloaded and locked up in a safe place like a safe or gun vault, or, use a gun lock and hide the keys. Always lock away bullets separately. To learn more, visit ActiveReplay.[place].gov and search for \"safe gun storage.\"  Nutrition  Eat 5 or more servings of fruits and vegetables each day.  Try wheat bread, brown rice and whole grain pasta (instead of white bread, rice, and pasta).  Get enough calcium and vitamin D. Check the label on foods and aim for 100% of the RDA (recommended daily allowance).  Regular exams  Have a dental exam and cleaning every 6 months.  See your health care team every year to talk about:  Any changes in your health.  Any medicines your care team has prescribed.  Preventive care, family planning, and ways to prevent chronic diseases.  Shots (vaccines)   HPV shots (up to age 26), if you've never had them before.  Hepatitis B shots (up to age 59), if you've never had them before.  COVID-19 shot: Get this shot when it's due.  Flu shot: Get a flu shot every year.  Tetanus shot: Get a tetanus shot every 10 years.  Pneumococcal, hepatitis A, and RSV shots: Ask your care team if you need these based on your risk.  Shingles shot (for age 50 and " up).  General health tests  Diabetes screening:  Starting at age 35, Get screened for diabetes at least every 3 years.  If you are younger than age 35, ask your care team if you should be screened for diabetes.  Cholesterol test: At age 39, start having a cholesterol test every 5 years, or more often if advised.  Bone density scan (DEXA): At age 50, ask your care team if you should have this scan for osteoporosis (brittle bones).  Hepatitis C: Get tested at least once in your life.  Abdominal aortic aneurysm screening: Talk to your doctor about having this screening if you:  Have ever smoked; and  Are biologically male; and  Are between the ages of 65 and 75.  STIs (sexually transmitted infections)  Before age 24: Ask your care team if you should be screened for STIs.  After age 24: Get screened for STIs if you're at risk. You are at risk for STIs (including HIV) if:  You are sexually active with more than one person.  You don't use condoms every time.  You or a partner was diagnosed with a sexually transmitted infection.  If you are at risk for HIV, ask about PrEP medicine to prevent HIV.  Get tested for HIV at least once in your life, whether you are at risk for HIV or not.  Cancer screening tests  Cervical cancer screening: If you have a cervix, begin getting regular cervical cancer screening tests at age 21. Most people who have regular screenings with normal results can stop after age 65. Talk about this with your provider.  Breast cancer scan (mammogram): If you've ever had breasts, begin having regular mammograms starting at age 40. This is a scan to check for breast cancer.  Colon cancer screening: It is important to start screening for colon cancer at age 45.  Have a colonoscopy test every 10 years (or more often if you're at risk) Or, ask your provider about stool tests like a FIT test every year or Cologuard test every 3 years.  To learn more about your testing options, visit:  "www.ServerEngines/222015.pdf.  For help making a decision, visit: wilda.brigitte/st10537.  Prostate cancer screening test: If you have a prostate and are age 55 to 69, ask your provider if you would benefit from a yearly prostate cancer screening test.  Lung cancer screening: If you are a current or former smoker age 50 to 80, ask your care team if ongoing lung cancer screenings are right for you.  For informational purposes only. Not to replace the advice of your health care provider. Copyright   2023 [facility]. All rights reserved. Clinically reviewed by the  [employer] [facility]. [employer] [id number] - REV 04/24.    Learning About Being Physically Active  What is physical activity?     Being physically active means doing any kind of activity that gets your body moving.  The types of physical activity that can help you get fit and stay healthy include:  Aerobic or \"cardio\" activities. These make your heart beat faster and make you breathe harder, such as brisk walking, riding a bike, or running. They strengthen your heart and lungs and build up your endurance.  Strength training activities. These make your muscles work against, or \"resist,\" something. Examples include lifting weights or doing push-ups. These activities help tone and strengthen your muscles and bones.  Stretches. These let you move your joints and muscles through their full range of motion. Stretching helps you be more flexible.  Reaching a balance between these three types of physical activity is important because each one contributes to your overall fitness.  What are the benefits of being active?  Being active is one of the best things you can do for your health. It helps you to:  Feel stronger and have more energy to do all the things you like to do.  Focus better at school or work.  Feel, think, and sleep better.  Reach and stay at a healthy weight.  Lose fat and build lean muscle.  Lower your risk for serious health " "problems, including diabetes, heart attack, high blood pressure, and some cancers.  Keep your heart, lungs, bones, muscles, and joints strong and healthy.  How can you make being active part of your life?  Start slowly. Make it your long-term goal to get at least 30 minutes of exercise on most days of the week. Walking is a good choice. You also may want to do other activities, such as running, swimming, cycling, or playing tennis or team sports.  Pick activities that you like--ones that make your heart beat faster, your muscles stronger, and your muscles and joints more flexible. If you find more than one thing you like doing, do them all. You don't have to do the same thing every day.  Get your heart pumping every day. Any activity that makes your heart beat faster and keeps it at that rate for a while counts.  Here are some great ways to get your heart beating faster:  Go for a brisk walk, run, or hike.  Go for a swim or bike ride.  Take an online exercise class or dance.  Play a game of touch football, basketball, or soccer.  Play tennis, pickleball, or racquetball.  Climb stairs.  Even some household chores can be aerobic. Just do them at a faster pace. Raking or mowing the lawn, sweeping the garage, and vacuuming and cleaning your home all can help get your heart rate up.  Strengthen your muscles during the week. You don't have to lift heavy weights or grow big, bulky muscles to get stronger. Doing a few simple activities that make your muscles work against, or \"resist,\" something can help you get stronger. Aim for at least twice a week.  For example, you can:  Do push-ups or sit-ups, which use your own body weight as resistance.  Lift weights or dumbbells or use stretch bands at home or in a gym or community center.  Stretch your muscles often. Stretching will help you as you become more active. It can help you stay flexible and loosen tight muscles. It can also help improve your balance and posture and can " "be a great way to relax.  Be sure to stretch the muscles you'll be using when you work out. It's best to warm your muscles slightly before you stretch them. Walk or do some other light aerobic activity for a few minutes. Then start stretching.  When you stretch your muscles:  Do it slowly. Stretching is not about going fast or making sudden movements.  Don't push or bounce during a stretch.  Hold each stretch for at least 15 to 30 seconds, if you can. You should feel a stretch in the muscle, but not pain.  Breathe out as you do the stretch. Then breathe in as you hold the stretch. Don't hold your breath.  If you're worried about how more activity might affect your health, have a checkup before you start. Follow any special advice your doctor gives you for getting a smart start.  Where can you learn more?  Go to https://www.[employer].net/patiented  Enter W332 in the search box to learn more about \"Learning About Being Physically Active.\"  Current as of: June 5, 2023               Content Version: 14.0    4738-9905 [employer].   Care instructions adapted under license by your healthcare professional. If you have questions about a medical condition or this instruction, always ask your healthcare professional. [employer] disclaims any warranty or liability for your use of this information.      "

## 2024-05-21 NOTE — PROGRESS NOTES
"Preventive Care Visit  [facility]  Fabricio Cannon MD, Family Medicine  May 21, 2024      Assessment & Plan     Routine general medical examination at a health care facility      Mixed hyperlipidemia  Stable, refill and recheck labs  - Lipid panel reflex to direct LDL Non-fasting; Future  - atorvastatin (LIPITOR) 10 MG tablet; Take 1 tablet (10 mg) by mouth daily  - Comprehensive metabolic panel (BMP + Alb, Alk Phos, ALT, AST, Total. Bili, TP); Future    History of atrial fibrillation  Resolved with ablation    Screening PSA (prostate specific antigen)  - Prostate Specific Antigen Screen; Future    Screening for lipid disorders  - Lipid panel reflex to direct LDL Non-fasting; Future    History of cardiomyopathy  Due to afib    Patient has been advised of split billing requirements and indicates understanding: Yes        Nicotine/Tobacco Cessation  He reports that he has been smoking cigars. He has never used smokeless tobacco.  Nicotine/Tobacco Cessation Plan  Self help information given to patient      BMI  Estimated body mass index is 32.11 kg/m  as calculated from the following:    Height as of this encounter: 1.719 m (5' 7.68\").    Weight as of this encounter: 94.9 kg (209 lb 3.2 oz).   Weight management plan: Discussed healthy diet and exercise guidelines    Counseling  Appropriate preventive services were discussed with this patient, including applicable screening as appropriate for fall prevention, nutrition, physical activity, Tobacco-use cessation, weight loss and cognition.  Checklist reviewing preventive services available has been given to the patient.  Reviewed patient's diet, addressing concerns and/or questions.       See Patient Instructions    Jordin Morocho is a 59 year old, presenting for the following:  Hyperlipidemia (Med check) and Physical (fasting)        5/21/2024     7:22 AM   Additional Questions   Roomed by Jessenia Headley   Accompanied by self         " 5/21/2024     7:22 AM   Patient Reported Additional Medications   Patient reports taking the following new medications none        Health Care Directive  Patient does not have a Health Care Directive or Living Will: Patient states has Advance Directive and will bring in a copy to clinic.    Hyperlipidemia    Healthy Habits:     Taking medications regularly:  0  History of Present Illness       Reason for visit:  Annual exam    He eats 0-1 servings of fruits and vegetables daily.He consumes 0 sweetened beverage(s) daily.He exercises with enough effort to increase his heart rate 10 to 19 minutes per day.  He exercises with enough effort to increase his heart rate 3 or less days per week.   He is taking medications regularly.        Hyperlipidemia Follow-Up    Are you regularly taking any medication or supplement to lower your cholesterol?   Yes- Atorvastatin  Are you having muscle aches or other side effects that you think could be caused by your cholesterol lowering medication?  No          5/21/2024   General Health   How would you rate your overall physical health? Good   Feel stress (tense, anxious, or unable to sleep) Not at all         5/21/2024   Nutrition   Three or more servings of calcium each day? (!) NO   Diet: Regular (no restrictions)   How many servings of fruit and vegetables per day? (!) 0-1   How many sweetened beverages each day? 0-1         5/21/2024   Exercise   Days per week of moderate/strenous exercise 4 days   Average minutes spent exercising at this level 20 min         5/21/2024   Social Factors   Frequency of gathering with friends or relatives Twice a week   Worry food won't last until get money to buy more No   Food not last or not have enough money for food? No   Do you have housing?  Yes   Are you worried about losing your housing? No   Lack of transportation? No   Unable to get utilities (heat,electricity)? No         5/21/2024   Fall Risk   Fallen 2 or more times in the past year? No    Trouble with walking or balance? No          5/21/2024   Dental   Dentist two times every year? Yes         5/21/2024   TB Screening   Were you born outside of the [place]? No         Today's PHQ-2 Score:       5/20/2024     7:34 PM   PHQ-2 ( 1999 Pfizer)   Q1: Little interest or pleasure in doing things 0   Q2: Feeling down, depressed or hopeless 0   PHQ-2 Score 0   Q1: Little interest or pleasure in doing things Not at all   Q2: Feeling down, depressed or hopeless Not at all   PHQ-2 Score 0           5/21/2024   Substance Use   Alcohol more than 3/day or more than 7/wk No   Do you use any other substances recreationally? No     Social History     Tobacco Use    Smoking status: Some Days     Types: Cigars    Smokeless tobacco: Never    Tobacco comments:     Smokes a cigar every three months   Vaping Use    Vaping status: Never Used   Substance Use Topics    Drug use: Not Currently           5/21/2024   STI Screening   New sexual partner(s) since last STI/HIV test? No   Last PSA:   Prostate Specific Antigen Screen   Date Value Ref Range Status   09/14/2020 0.8 0.0 - 3.5 ng/mL Final     ASCVD Risk   The 10-year ASCVD risk score (Renato TAYLOR, et al., 2019) is: 18.4%    Values used to calculate the score:      Age: 59 years      Sex: Male      Is Non- : No      Diabetic: No      Tobacco smoker: Yes      Systolic Blood Pressure: 154 mmHg      Is BP treated: No      HDL Cholesterol: 45 mg/dL      Total Cholesterol: 173 mg/dL         Reviewed and updated as needed this visit by Provider                    BP Readings from Last 3 Encounters:   05/21/24 (!) 154/102   12/13/22 136/85   11/29/22 136/76    Wt Readings from Last 3 Encounters:   05/21/24 94.9 kg (209 lb 3.2 oz)   12/13/22 86.2 kg (190 lb)   11/29/22 87.3 kg (192 lb 6.4 oz)               Review of Systems  Constitutional, HEENT, cardiovascular, pulmonary, gi and gu systems are negative, except as otherwise noted.     Objective   "  Exam  /82   Pulse 78   Temp 97.7  F (36.5  C) (Tympanic)   Resp 20   Ht 1.719 m (5' 7.68\")   Wt 94.9 kg (209 lb 3.2 oz)   SpO2 94%   BMI 32.11 kg/m     Estimated body mass index is 32.11 kg/m  as calculated from the following:    Height as of this encounter: 1.719 m (5' 7.68\").    Weight as of this encounter: 94.9 kg (209 lb 3.2 oz).    Physical Exam  GENERAL: alert and no distress  EYES: Eyes grossly normal to inspection, PERRL and conjunctivae and sclerae normal  HENT: ear canals and TM's normal, nose and mouth without ulcers or lesions  NECK: no adenopathy, no asymmetry, masses, or scars  RESP: lungs clear to auscultation - no rales, rhonchi or wheezes  CV: regular rate and rhythm, normal S1 S2, no S3 or S4, no murmur, click or rub, no peripheral edema  ABDOMEN: soft, nontender, no hepatosplenomegaly, no masses and bowel sounds normal  MS: no gross musculoskeletal defects noted, no edema  SKIN: no suspicious lesions or rashes  NEURO: Normal strength and tone, mentation intact and speech normal  PSYCH: mentation appears normal, affect normal/bright        Signed Electronically by: Fabricio Cannon MD    "

## 2024-09-06 DIAGNOSIS — H43.393 VITREOUS SYNERESIS OF BOTH EYES: Primary | ICD-10-CM

## 2024-09-30 ENCOUNTER — TELEPHONE (OUTPATIENT)
Dept: OPHTHALMOLOGY | Facility: CLINIC | Age: 59
End: 2024-09-30
Payer: COMMERCIAL

## 2024-09-30 NOTE — TELEPHONE ENCOUNTER
[facility] Call Center    Phone Message    May a detailed message be left on voicemail: yes     Reason for Call: Appointment Intake    Referring Provider Name: Dr. Hagan   Diagnosis and/or Symptoms: 4 months OCT macula; DFE both eyes ; MARLENY left eye ; BAT both eyes  Possible yag laser left eye per pt, recs in epic,self referred, [facility] loc anila   Other: Writer unable to schedule appointment due to possible YAG laser left eye.  Please advise and contact patient to schedule at [phone].  Thank you!     Action Taken: Message routed to:  [facility]     Travel Screening: Not Applicable     Date of Service:

## 2024-09-30 NOTE — TELEPHONE ENCOUNTER
Returned call to patient.     Patient scheduled on 10/23/24 with Dr. Hagan, appointment time 2:10 PM, arrival time of 1:55 PM. Patient aware of date/time/location/duration (2-4 hrs)/parking/hospital based clinic information for appointment. Patient confirmed appointment time and all questions were answered.     Pepper Castillo RN 10:11 AM 09/30/24

## 2024-10-23 ENCOUNTER — OFFICE VISIT (OUTPATIENT)
Dept: OPHTHALMOLOGY | Facility: CLINIC | Age: 59
End: 2024-10-23
Attending: OPHTHALMOLOGY
Payer: COMMERCIAL

## 2024-10-23 DIAGNOSIS — H43.393 VITREOUS SYNERESIS OF BOTH EYES: ICD-10-CM

## 2024-10-23 PROCEDURE — 92134 CPTRZ OPH DX IMG PST SGM RTA: CPT | Performed by: OPHTHALMOLOGY

## 2024-10-23 PROCEDURE — 99214 OFFICE O/P EST MOD 30 MIN: CPT | Performed by: OPHTHALMOLOGY

## 2024-10-23 ASSESSMENT — REFRACTION_WEARINGRX
OS_ADD: +2.18
OD_ADD: +2.17
SPECS_TYPE: PAL
OD_AXIS: 003
OD_CYLINDER: +0.75
OS_SPHERE: -1.00
OS_AXIS: 007
OS_CYLINDER: +1.00
OD_SPHERE: -0.75

## 2024-10-23 ASSESSMENT — VISUAL ACUITY
OD_BAT_HIGH: 20/40
OD_BAT_LOW: 20/25
OS_BAT_LOW: 20/40
OS_BAT_HIGH: 20/80
CORRECTION_TYPE: GLASSES
OS_CC: 20/40
OD_CC: 20/25
OD_BAT_MED: 20/25
OS_BAT_MED: 20/50
METHOD: SNELLEN - LINEAR

## 2024-10-23 ASSESSMENT — EXTERNAL EXAM - LEFT EYE: OS_EXAM: NORMAL

## 2024-10-23 ASSESSMENT — EXTERNAL EXAM - RIGHT EYE: OD_EXAM: NORMAL

## 2024-10-23 ASSESSMENT — SLIT LAMP EXAM - LIDS
COMMENTS: NORMAL
COMMENTS: NORMAL

## 2024-10-23 ASSESSMENT — CUP TO DISC RATIO
OD_RATIO: 0.3
OS_RATIO: 0.3

## 2024-10-23 ASSESSMENT — TONOMETRY
OD_IOP_MMHG: 13
IOP_METHOD: TONOPEN
OS_IOP_MMHG: 11

## 2024-10-23 NOTE — NURSING NOTE
Chief Complaints and History of Present Illnesses   Patient presents with    Follow Up      Epiretinal membrane both eyes   lattice degeneration right eye     Chief Complaint(s) and History of Present Illness(es)       Follow Up              Comments:  Epiretinal membrane both eyes   lattice degeneration right eye              Comments    Pt states no change in [place] since last visit  Floaters same as before   No flashes , eye pain or tearing     Yennifer Cabral COT 2:15 PM October 23, 2024

## 2024-10-23 NOTE — PROGRESS NOTES
CC: follow up Epiretinal membrane both eyes   Cataract extraction intraocular lens left eye 12/13/22   interval: noticed mild distortion of the vision when driving. No flashes and rare floaters     Past ocular history:  07/31/2022 sts/p 25gPPV/SBP/EL/SF6/EL left eye 07/31/2022  indirect laser ophthalmoscopy right eye   for left RRD and significant lattice burden both eyes    OCT mac 10/23/24   - Right eye: mild  ERM, fovea normal, retinal normal - stable  - Left eye: mild ERM, fovea normal, macula now flat. stable    Assessment    # pseudophakia left eye 12.13.2022  S/p YAG Capsulotomy 3/30/23; small opening and patient with some glare  Can consider additional yag   Axis clear; intraocular lens in good position  Prescription given 04/10/23   Observe    # cataract right eye   Observe for now  Patient asymptomatic     # s/p 25gPPV/SBP/EL/SF6/EL left eye 07/31/2022  For Retinal detachment left eye   - Retina flat and attached 360 both eyes  Retina detachment precautions were discussed with the patient (presence or increased in flashes, floaters or a curtain in the visual field) and was asked to return if any of the those occur     # lattice degeneration right eye  Status post laser retinopexy  07/31/2022  Retina attached    # Blepharitis and Dry eye syndrome   History of rosacea  - follows with a general ophthalmologist in the community but unable to go see them for refills of doxycycline  - one time refill sent today until able to follow with general ophthalmologist    # Epiretinal membrane both eyes   Left eye > right eye  With mild metamorphopsia left eye      RTC: 6 months OCT macula; DFE both eyes   ~~~~~~~~~~~~   Complete documentation of historical and exam elements from today's encounter can be found in the full encounter summary report (not reduplicated in this progress note).  I personally obtained the chief complaint(s) and history of present illness.  I confirmed and edited as necessary the review of  systems, past medical/surgical history, family history, social history, and examination findings as documented by others; and I examined the patient myself.  I personally reviewed the relevant tests, images, and reports as documented above.  I formulated and edited as necessary the assessment and plan and discussed the findings and management plan with the patient and family    Margot Hagan MD  Professor of Ophthalmology  Vitreo-Retinal surgeon   Department of Ophthalmology and Visual Neurosciences   [facility]  Phone: [phone]   Fax: [phone]

## 2024-12-15 ENCOUNTER — HOSPITAL ENCOUNTER (EMERGENCY)
Facility: CLINIC | Age: 59
Discharge: HOME | End: 2024-12-15
Attending: STUDENT IN AN ORGANIZED HEALTH CARE EDUCATION/TRAINING PROGRAM | Admitting: STUDENT IN AN ORGANIZED HEALTH CARE EDUCATION/TRAINING PROGRAM
Payer: COMMERCIAL

## 2024-12-15 VITALS
TEMPERATURE: 98.1 F | WEIGHT: 209 LBS | BODY MASS INDEX: 31.67 KG/M2 | HEART RATE: 76 BPM | DIASTOLIC BLOOD PRESSURE: 82 MMHG | HEIGHT: 68 IN | RESPIRATION RATE: 16 BRPM | SYSTOLIC BLOOD PRESSURE: 121 MMHG | OXYGEN SATURATION: 96 %

## 2024-12-15 DIAGNOSIS — R19.7 BLOODY DIARRHEA: ICD-10-CM

## 2024-12-15 LAB
ALBUMIN SERPL BCG-MCNC: 3.7 G/DL (ref 3.5–5.2)
ALP SERPL-CCNC: 64 U/L (ref 40–150)
ALT SERPL W P-5'-P-CCNC: 24 U/L (ref 0–70)
ANION GAP SERPL CALCULATED.3IONS-SCNC: 12 MMOL/L (ref 7–15)
AST SERPL W P-5'-P-CCNC: 21 U/L (ref 0–45)
BASOPHILS # BLD AUTO: 0 10E3/UL (ref 0–0.2)
BASOPHILS NFR BLD AUTO: 1 %
BILIRUB SERPL-MCNC: 0.4 MG/DL
BUN SERPL-MCNC: 15.8 MG/DL (ref 8–23)
CALCIUM SERPL-MCNC: 8.3 MG/DL (ref 8.8–10.4)
CHLORIDE SERPL-SCNC: 101 MMOL/L (ref 98–107)
CREAT SERPL-MCNC: 0.93 MG/DL (ref 0.67–1.17)
EGFRCR SERPLBLD CKD-EPI 2021: >90 ML/MIN/1.73M2
EOSINOPHIL # BLD AUTO: 0.2 10E3/UL (ref 0–0.7)
EOSINOPHIL NFR BLD AUTO: 2 %
ERYTHROCYTE [DISTWIDTH] IN BLOOD BY AUTOMATED COUNT: 13.4 % (ref 10–15)
GLUCOSE SERPL-MCNC: 135 MG/DL (ref 70–99)
HCO3 SERPL-SCNC: 26 MMOL/L (ref 22–29)
HCT VFR BLD AUTO: 41.8 % (ref 40–53)
HGB BLD-MCNC: 13.5 G/DL (ref 13.3–17.7)
IMM GRANULOCYTES # BLD: 0 10E3/UL
IMM GRANULOCYTES NFR BLD: 0 %
LYMPHOCYTES # BLD AUTO: 2.5 10E3/UL (ref 0.8–5.3)
LYMPHOCYTES NFR BLD AUTO: 34 %
MCH RBC QN AUTO: 30.4 PG (ref 26.5–33)
MCHC RBC AUTO-ENTMCNC: 32.3 G/DL (ref 31.5–36.5)
MCV RBC AUTO: 94 FL (ref 78–100)
MONOCYTES # BLD AUTO: 0.9 10E3/UL (ref 0–1.3)
MONOCYTES NFR BLD AUTO: 12 %
NEUTROPHILS # BLD AUTO: 3.8 10E3/UL (ref 1.6–8.3)
NEUTROPHILS NFR BLD AUTO: 51 %
NRBC # BLD AUTO: 0 10E3/UL
NRBC BLD AUTO-RTO: 0 /100
PLATELET # BLD AUTO: 268 10E3/UL (ref 150–450)
POTASSIUM SERPL-SCNC: 3.8 MMOL/L (ref 3.4–5.3)
PROT SERPL-MCNC: 6.4 G/DL (ref 6.4–8.3)
RBC # BLD AUTO: 4.44 10E6/UL (ref 4.4–5.9)
SODIUM SERPL-SCNC: 139 MMOL/L (ref 135–145)
WBC # BLD AUTO: 7.5 10E3/UL (ref 4–11)

## 2024-12-15 PROCEDURE — 36415 COLL VENOUS BLD VENIPUNCTURE: CPT | Performed by: STUDENT IN AN ORGANIZED HEALTH CARE EDUCATION/TRAINING PROGRAM

## 2024-12-15 PROCEDURE — 258N000003 HC RX IP 258 OP 636: Performed by: STUDENT IN AN ORGANIZED HEALTH CARE EDUCATION/TRAINING PROGRAM

## 2024-12-15 PROCEDURE — 99284 EMERGENCY DEPT VISIT MOD MDM: CPT | Mod: 25 | Performed by: STUDENT IN AN ORGANIZED HEALTH CARE EDUCATION/TRAINING PROGRAM

## 2024-12-15 PROCEDURE — 87493 C DIFF AMPLIFIED PROBE: CPT | Performed by: STUDENT IN AN ORGANIZED HEALTH CARE EDUCATION/TRAINING PROGRAM

## 2024-12-15 PROCEDURE — 99284 EMERGENCY DEPT VISIT MOD MDM: CPT | Performed by: STUDENT IN AN ORGANIZED HEALTH CARE EDUCATION/TRAINING PROGRAM

## 2024-12-15 PROCEDURE — 85041 AUTOMATED RBC COUNT: CPT | Performed by: STUDENT IN AN ORGANIZED HEALTH CARE EDUCATION/TRAINING PROGRAM

## 2024-12-15 PROCEDURE — 93005 ELECTROCARDIOGRAM TRACING: CPT | Performed by: STUDENT IN AN ORGANIZED HEALTH CARE EDUCATION/TRAINING PROGRAM

## 2024-12-15 PROCEDURE — 82310 ASSAY OF CALCIUM: CPT | Performed by: STUDENT IN AN ORGANIZED HEALTH CARE EDUCATION/TRAINING PROGRAM

## 2024-12-15 PROCEDURE — 85004 AUTOMATED DIFF WBC COUNT: CPT | Performed by: STUDENT IN AN ORGANIZED HEALTH CARE EDUCATION/TRAINING PROGRAM

## 2024-12-15 PROCEDURE — 93010 ELECTROCARDIOGRAM REPORT: CPT | Performed by: STUDENT IN AN ORGANIZED HEALTH CARE EDUCATION/TRAINING PROGRAM

## 2024-12-15 PROCEDURE — 87507 IADNA-DNA/RNA PROBE TQ 12-25: CPT | Performed by: STUDENT IN AN ORGANIZED HEALTH CARE EDUCATION/TRAINING PROGRAM

## 2024-12-15 PROCEDURE — 96360 HYDRATION IV INFUSION INIT: CPT | Performed by: STUDENT IN AN ORGANIZED HEALTH CARE EDUCATION/TRAINING PROGRAM

## 2024-12-15 RX ADMIN — SODIUM CHLORIDE, POTASSIUM CHLORIDE, SODIUM LACTATE AND CALCIUM CHLORIDE 1000 ML: 600; 310; 30; 20 INJECTION, SOLUTION INTRAVENOUS at 21:37

## 2024-12-15 ASSESSMENT — COLUMBIA-SUICIDE SEVERITY RATING SCALE - C-SSRS
6. HAVE YOU EVER DONE ANYTHING, STARTED TO DO ANYTHING, OR PREPARED TO DO ANYTHING TO END YOUR LIFE?: NO
1. IN THE PAST MONTH, HAVE YOU WISHED YOU WERE DEAD OR WISHED YOU COULD GO TO SLEEP AND NOT WAKE UP?: NO
2. HAVE YOU ACTUALLY HAD ANY THOUGHTS OF KILLING YOURSELF IN THE PAST MONTH?: NO

## 2024-12-15 ASSESSMENT — ACTIVITIES OF DAILY LIVING (ADL)
ADLS_ACUITY_SCORE: 43
ADLS_ACUITY_SCORE: 43

## 2024-12-16 ENCOUNTER — TELEPHONE (OUTPATIENT)
Dept: OPHTHALMOLOGY | Facility: CLINIC | Age: 59
End: 2024-12-16
Payer: COMMERCIAL

## 2024-12-16 ENCOUNTER — TELEPHONE (OUTPATIENT)
Dept: NURSING | Facility: CLINIC | Age: 59
End: 2024-12-16
Payer: COMMERCIAL

## 2024-12-16 DIAGNOSIS — H01.01A ULCERATIVE BLEPHARITIS OF UPPER AND LOWER EYELIDS OF BOTH EYES: ICD-10-CM

## 2024-12-16 DIAGNOSIS — H01.01B ULCERATIVE BLEPHARITIS OF UPPER AND LOWER EYELIDS OF BOTH EYES: ICD-10-CM

## 2024-12-16 LAB
ADV 40+41 DNA STL QL NAA+NON-PROBE: NEGATIVE
ASTRO TYP 1-8 RNA STL QL NAA+NON-PROBE: NEGATIVE
C CAYETANENSIS DNA STL QL NAA+NON-PROBE: NEGATIVE
C DIFF TOX B STL QL: NEGATIVE
CAMPYLOBACTER DNA SPEC NAA+PROBE: NEGATIVE
CRYPTOSP DNA STL QL NAA+NON-PROBE: NEGATIVE
E COLI O157 DNA STL QL NAA+NON-PROBE: NEGATIVE
E HISTOLYT DNA STL QL NAA+NON-PROBE: NEGATIVE
EAEC ASTA GENE ISLT QL NAA+PROBE: POSITIVE
EC STX1+STX2 GENES STL QL NAA+NON-PROBE: POSITIVE
EPEC EAE GENE STL QL NAA+NON-PROBE: ABNORMAL
ETEC LTA+ST1A+ST1B TOX ST NAA+NON-PROBE: NEGATIVE
G LAMBLIA DNA STL QL NAA+NON-PROBE: NEGATIVE
NOROVIRUS GI+II RNA STL QL NAA+NON-PROBE: NEGATIVE
P SHIGELLOIDES DNA STL QL NAA+NON-PROBE: NEGATIVE
RVA RNA STL QL NAA+NON-PROBE: NEGATIVE
SALMONELLA SP RPOD STL QL NAA+PROBE: NEGATIVE
SAPO I+II+IV+V RNA STL QL NAA+NON-PROBE: NEGATIVE
SHIGELLA SP+EIEC IPAH ST NAA+NON-PROBE: NEGATIVE
V CHOLERAE DNA SPEC QL NAA+PROBE: NEGATIVE
VIBRIO DNA SPEC NAA+PROBE: NEGATIVE
Y ENTEROCOL DNA STL QL NAA+PROBE: NEGATIVE

## 2024-12-16 RX ORDER — DOXYCYCLINE 50 MG/1
50 TABLET ORAL DAILY
Qty: 30 TABLET | Refills: 2 | Status: SHIPPED | OUTPATIENT
Start: 2024-12-16

## 2024-12-16 NOTE — ED TRIAGE NOTES
Hx of chronic oral doxycycline for his eye.    HX of a hemorrhoid. 20 episodes of diarrhea today.      After one of his last bowel movements he got dizzy and had a syncopal event. Wife called 911    Hypotension on scene for EMS 80's/xx. IV and 500 ml from EMS

## 2024-12-16 NOTE — TELEPHONE ENCOUNTER
[facility]    Reason for call: Return call.  Has more questions about the spread of this bacteria    Lab Result (including Rx patient on, if applicable).  If culture, copy of lab report at bottom.  Lab Result:   Component      Latest Ref Rng 12/15/2024  10:02 PM   Shiga-like toxin-producing E. coli (STEC)      Negative  Positive !    Enteroaggregative E. coli (EAEC)      Negative  Positive !       Legend:  ! Abnormal    Creatinine Level (mg/dl)   Creatinine   Date Value Ref Range Status   12/15/2024 0.93 0.67 - 1.17 mg/dL Final    Creatinine clearance (ml/min), if applicable    Serum creatinine: 0.93 mg/dL 12/15/24 2121  Estimated creatinine clearance: 93.2 mL/min     Patient's current Symptoms:   Feeling better today.  Hoping to go to [employer] tonight  Has appt with PCP tomorrow    RN Recommendations/Instructions per [facility] ED lab result protocol:   [facility] ED lab result protocol utilized: misc  Stressed proper handwashing  Sent link to CDC.gov      Tano Rosales RN

## 2024-12-16 NOTE — TELEPHONE ENCOUNTER
# Blepharitis and Dry eye syndrome   History of rosacea  - follows with a general ophthalmologist in the community but unable to go see them for refills of doxycycline  - one time refill sent today until able to follow with general ophthalmologist      --    Above per last Dr. Hagan note 10-    Triage team to contact pt with information/review refill request.    Brian Wolf RN 11:25 AM 12/16/24

## 2024-12-16 NOTE — TELEPHONE ENCOUNTER
"[facility]    Reason for call: Lab Result Notification     Lab Result (including Rx patient on, if applicable).  If culture, copy of lab report at bottom.  Lab Result:   Component      Latest Ref Rng 12/15/2024  10:02 PM   Shiga-like toxin-producing E. coli (STEC)      Negative  Positive !       Legend:  ! Abnormal  Component      Latest Ref Rng 12/15/2024  10:02 PM   Enteroaggregative E. coli (EAEC)      Negative  Positive !       Legend:  ! Abnormal    Creatinine Level (mg/dl)   Creatinine   Date Value Ref Range Status   12/15/2024 0.93 0.67 - 1.17 mg/dL Final    Creatinine clearance (ml/min), if applicable    Serum creatinine: 0.93 mg/dL 12/15/24 2121  Estimated creatinine clearance: 93.2 mL/min     ED Symptoms: Presented to the ED with diarrhea and rectal bleeding.  Also states he \"passed out\" during his last bowel movement.      Current Symptoms: A couple of minor bowel movements this morning. States that his stools are still bloody.  Feeling better than last night.  Has been drinking electrolyte solutions and states he is hungry and wants to eat. He does have a follow up tomorrow with his PCP.     RN Recommendations/Instructions per [place] ED lab result protocol:   [facility] ED lab result protocol utilized: Enteric bacteria    Patient/care giver notified to contact your PCP clinic or return to the Emergency department if your:  Symptoms worsen or other concerning symptoms.       LISE JOHNSON RN  "

## 2024-12-16 NOTE — ED PROVIDER NOTES
"  History     Chief Complaint   Patient presents with    Rectal Bleeding    Diarrhea     HPI  Rashawn Alonso is a 59 year old male who has history of atrial fibrillation status post ablation and not on anticoagulation, cardiomyopathy, anemia, on chronic doxycycline for blepharitis, who presents to the ER for evaluation of diarrhea and rectal bleeding.  Patient states that he was doing well this morning, but this afternoon he developed abdominal cramps and has had \"explosive diarrhea\" ever since then.  He states that he estimates he has had about 20 bowel movements.  He states that they are straight water mixed with blood.  He has been taking Imodium which is usually helpful for diarrhea, but is not seem to be helpful today.  He came to the ER because he passed out on the toilet after his most recent bowel movement.  He denied any prodromal chest pain or trouble breathing.  He did feel dizzy prior to this.  He also reported feeling dizzy with standing up.  He has no dizziness, chest pain or trouble breathing now.  No weakness in the extremities.  He is on baby aspirin only.  He denies abdominal pain.  Denies fever.  Denies alcohol use.  He denies history of C. difficile.  No recent travel.  Denies eating any strange foods.  No known ill contacts.  No rashes.  No urinary symptoms.  He does report history of a hemorrhoid.  Patient was noted to have systolic blood pressures in the 80s and medics gave 500 cc of normal saline with improvement.    Allergies:  No Known Allergies    Problem List:    Patient Active Problem List    Diagnosis Date Noted    History of atrial fibrillation 05/21/2024     Priority: Medium     Resolved with ablation      DDD (degenerative disc disease), lumbar 05/21/2024     Priority: Medium    History of cardiomyopathy 05/21/2024     Priority: Medium     Due to atrial fibrillation      Posterior subcapsular polar senile cataract of left eye 11/21/2022     Priority: Medium     Added automatically " from request for surgery [id number]      Hyperlipidemia 08/15/2011     Priority: Medium    Umbilical hernia with obstruction 05/02/2008     Priority: Medium        Past Medical History:    Past Medical History:   Diagnosis Date    A-fib (H)     Cardiomyopathy, secondary (H)     H/O cardiac radiofrequency ablation 01/01/2000    Hypercholesteremia     Mitral insufficiency     S/P pulmonary vein repair 01/01/2010    S/P pulmonary vein repair 01/01/2011       Past Surgical History:    Past Surgical History:   Procedure Laterality Date    CATARACT IOL, RT/LT      COLONOSCOPY      COLONOSCOPY N/A 06/21/2017    Procedure: COLONOSCOPY;  colonoscopy ;  Surgeon: Brian Mistry MD;  Location: [facility]    EP ABLATION / EP STUDIES      for afib    EXAM UNDER ANESTHESIA, LASER DIODE RETINA, COMBINED Right 07/31/2022    Procedure: Right Eye Green Diode Laser;  Surgeon: Margot Hagan MD;  Location: [facility] OR    HERNIA REPAIR      HERNIA REPAIR, UMBILICAL      OTHER SURGICAL HISTORY      BILATERAL INGUINAL HERNIORRHAPY    PHACOEMULSIFICATION WITH STANDARD INTRAOCULAR LENS IMPLANT Left 12/13/2022    Procedure: LEFT EYE PHACOEMULSIFICATION, CATARACT, WITH STANDARD INTRAOCULAR LENS IMPLANT INSERTION;  Surgeon: Margot Hagan MD;  Location: [facility] OR    RADIOFREQUENCY ABLATION      X 2    RETINAL REATTACHMENT      VASECTOMY         Family History:    Family History   Problem Relation Age of Onset    Hypertension Mother     Cerebrovascular Disease Mother     Atrial fibrillation Mother     Kidney Cancer Mother     Heart Disease Father     Other - See Comments Father         bypass surgery    Diverticulitis Father     Breast Cancer Sister     Retinal detachment Sister     Cancer Sister     Breast Cancer Sister     Leukemia Sister         Acute    Ovarian Cancer Other         Family    Glaucoma No family hx of     Macular Degeneration No family hx of        Social History:  Marital Status:   [2]  Social History     Tobacco  "Use    Smoking status: Some Days     Types: Cigars    Smokeless tobacco: Never    Tobacco comments:     Smokes a cigar every three months   Vaping Use    Vaping status: Never Used   Substance Use Topics    Drug use: Not Currently        Medications:    ASPIRIN PO  atorvastatin (LIPITOR) 10 MG tablet  cholecalciferol (VITAMIN D3) 10 mcg (400 units) TABS tablet  doxycycline monohydrate (ADOXA) 50 MG tablet  multivitamin w/minerals (THERA-VIT-M) tablet          Review of Systems  See HPI    Physical Exam   BP: 121/78  Pulse: 67  Temp: 97.9  F (36.6  C)  Resp: 20  Height: 172.7 cm (5' 8\")  Weight: 94.8 kg (209 lb)  SpO2: 98 %      Physical Exam  /82   Pulse 76   Temp 98.1  F (36.7  C) (Oral)   Resp 16   Ht 1.727 m (5' 8\")   Wt 94.8 kg (209 lb)   SpO2 96%   BMI 31.78 kg/m    General: alert, interactive, in no apparent distress  Head: atraumatic  Nose: no rhinorrhea or epistaxis  Ears: no external auditory canal discharge or bleeding.    Eyes: Sclera nonicteric. Conjunctiva noninjected. PERRL, EOMI  Mouth: no tonsillar erythema, edema, or exudate.  Moist mucous membranes  Neck: supple, moving spontaneously no midline cervical tenderness  Lungs: No increased work of breathing.  Clear to auscultation bilaterally.  CV: RRR, peripheral pulses palpable and symmetric  Abdomen: soft, nt, nd, no guarding or rebound.  Rectal exam: There is dried red blood around the anus. No obvious hemorrhoid.  No stool in the rectal vault.  No masses.  There was bright red blood on my gloved finger.  Extremities: Warm and well-perfused.    Skin: no rash or diaphoresis  Neuro: CN II-XII grossly intact, strength 5/5 in UE and LEs bilaterally, sensation intact to light touch in UE and LEs bilaterally;     ED Course        Procedures              EKG Interpretation:      Interpreted by Jeison Mccoy MD  Time reviewed: 3:11 AM    Symptoms at time of EKG: None   Rhythm: normal sinus   Rate: Normal  Axis: Normal  Ectopy: premature " ventricular contractions (unifocal)  Conduction: normal  ST Segments/ T Waves: No ST-T wave changes  Q Waves: none  Comparison to prior: No old EKG available    Clinical Impression: EKG somewhat motion degraded.  Otherwise reassuring.  Normal sinus rhythm with a single PVC.  No acute ischemic changes.  No dysrhythmia.  No heart block.  Normal intervals.            Critical Care time:  none             Results for orders placed or performed during the hospital encounter of 12/15/24 (from the past 24 hours)   CBC with platelets, differential    Narrative    The following orders were created for panel order CBC with platelets, differential.  Procedure                               Abnormality         Status                     ---------                               -----------         ------                     CBC with platelets and d...[[id number]]                      Final result                 Please view results for these tests on the individual orders.   Comprehensive metabolic panel   Result Value Ref Range    Sodium 139 135 - 145 mmol/L    Potassium 3.8 3.4 - 5.3 mmol/L    Carbon Dioxide (CO2) 26 22 - 29 mmol/L    Anion Gap 12 7 - 15 mmol/L    Urea Nitrogen 15.8 8.0 - 23.0 mg/dL    Creatinine 0.93 0.67 - 1.17 mg/dL    GFR Estimate >90 >60 mL/min/1.73m2    Calcium 8.3 (L) 8.8 - 10.4 mg/dL    Chloride 101 98 - 107 mmol/L    Glucose 135 (H) 70 - 99 mg/dL    Alkaline Phosphatase 64 40 - 150 U/L    AST 21 0 - 45 U/L    ALT 24 0 - 70 U/L    Protein Total 6.4 6.4 - 8.3 g/dL    Albumin 3.7 3.5 - 5.2 g/dL    Bilirubin Total 0.4 <=1.2 mg/dL   CBC with platelets and differential   Result Value Ref Range    WBC Count 7.5 4.0 - 11.0 10e3/uL    RBC Count 4.44 4.40 - 5.90 10e6/uL    Hemoglobin 13.5 13.3 - 17.7 g/dL    Hematocrit 41.8 40.0 - 53.0 %    MCV 94 78 - 100 fL    MCH 30.4 26.5 - 33.0 pg    MCHC 32.3 31.5 - 36.5 g/dL    RDW 13.4 10.0 - 15.0 %    Platelet Count 268 150 - 450 10e3/uL    % Neutrophils 51 %    %  Lymphocytes 34 %    % Monocytes 12 %    % Eosinophils 2 %    % Basophils 1 %    % Immature Granulocytes 0 %    NRBCs per 100 WBC 0 <1 /100    Absolute Neutrophils 3.8 1.6 - 8.3 10e3/uL    Absolute Lymphocytes 2.5 0.8 - 5.3 10e3/uL    Absolute Monocytes 0.9 0.0 - 1.3 10e3/uL    Absolute Eosinophils 0.2 0.0 - 0.7 10e3/uL    Absolute Basophils 0.0 0.0 - 0.2 10e3/uL    Absolute Immature Granulocytes 0.0 <=0.4 10e3/uL    Absolute NRBCs 0.0 10e3/uL   ABO/Rh type and screen *Canceled*    Narrative    The following orders were created for panel order ABO/Rh type and screen.  Procedure                               Abnormality         Status                     ---------                               -----------         ------                       Please view results for these tests on the individual orders.   EKG 12-lead, tracing only   Result Value Ref Range    Systolic Blood Pressure  mmHg    Diastolic Blood Pressure  mmHg    Ventricular Rate 70 BPM    Atrial Rate 70 BPM    DC Interval 158 ms    QRS Duration 96 ms     ms    QTc 470 ms    P Axis 21 degrees    R AXIS -18 degrees    T Axis -6 degrees    Interpretation ECG       Sinus rhythm with occasional Premature ventricular complexes  Otherwise normal ECG  When compared with ECG of 23-May-2018 14:23,  Premature ventricular complexes are now Present         Medications   lactated ringers BOLUS 1,000 mL (0 mLs Intravenous Stopped 12/15/24 2236)       Assessments & Plan (with Medical Decision Making)     I have reviewed the nursing notes.    I have reviewed the findings, diagnosis, plan and need for follow up with the patient.          Medical Decision Making  Rashawn Alonso is a 59 year old male who has history of atrial fibrillation status post ablation and not on anticoagulation, cardiomyopathy, anemia, on chronic doxycycline for blepharitis, who presents to the ER for evaluation of diarrhea and rectal bleeding.  Vital signs reviewed and reassuring.  Patient was  given a liter of lactated Ringer's.  EKG is reassuring.  Labs are independently reviewed by me.  His CBC is without leukocytosis or anemia.  His CMP is normal.  Patient did have a bowel movement here that was liquidy bloody diarrhea.  Did send for enteric stool pathogen panel and C. difficile.  He is on chronic doxycycline, so his at risk of C. difficile, but seems less likely with the frankly bloody diarrhea.  Most concern for infectious diarrhea.  He does not have any abdominal pain or fever, so we will hold off on ER empiric antibiotics now.  He has had no recent travel.  I recommended supportive cares in the meantime.  He can continue Imodium as long as he is not having fevers.  Drink plenty of fluids.  We will call him if his stool studies come back positive.  I recommended close outpatient follow-up.  Return precautions were discussed.      12/16/24 2:41 PM  I called the patient and discussed his positive stool findings.  He tested positive for STEC.  He is feeling much better.  He had a loose bowel movement this morning, but is now feeling much better and has not had any more bloody stools.  He did take 1 more Imodium.  We discussed that with this particular pathogen, antibiotics are not recommended due to the risk of developing hemolytic uremic syndrome.  The patient is on chronic doxycycline for blepharitis.  I think that the risks of the antibiotic outweigh the benefits and I recommended he stop the antibiotic for couple of days until he is feeling better and he was in agreement with this.  Is probably fine for him to continue the Imodium given that he is not having any pain or fevers, but I recommended he try to avoid it if possible.  He has been drinking plenty of fluids.  He is actually even going to the [employer] game tonight.  He has a virtual appointment with his doctor tomorrow.  All questions were answered.      Discharge Medication List as of 12/15/2024 11:15 PM          Final diagnoses:   Bloody  diarrhea       12/15/2024   [facility] EMERGENCY DEPT       Jeison Mccoy MD  12/16/24 0317       Jeison Mccoy MD  12/16/24 [id number]

## 2024-12-16 NOTE — TELEPHONE ENCOUNTER
Called and spoke to Rashawn Morocho thought Dr. Lopez was going to refill all his meds so he does not have to see two providers -     Provided the message below     - follows with a general ophthalmologist in the community but unable to go see them for refills of doxycycline  - one time refill sent today until able to follow with general ophthalmologist     Rashawn is going to call his local eye doctor to schedule an appointment for his dry eye and  # Blepharitis     Advised we sent a  one time refill sent today until able to follow with general ophthalmologist     Lynda Euceda Communication Facilitator on 12/16/2024 at 2:12 PM

## 2024-12-16 NOTE — TELEPHONE ENCOUNTER
[facility] Call Center    Phone Message    May a detailed message be left on voicemail: yes     Reason for Call: Medication Question or concern regarding medication   Prescription Clarification  Name of Medication: doxycycline monohydrate (ADOXA) 50 MG tablet  Prescribing Provider: Dr Hagan   Pharmacy: [facility]  PH# [phone]   What on the order needs clarification? Pt wants to advise clinic of [facility] health insurance updated already in pts chart along with a new pharmacy see above. Pt advised this pharmacy will be faxing clinic requesting for a RX order with 3 - 90 day refills per pt request. Any questions please contact pt Thank you       Action Taken: Message routed to:  [facility] eye    Travel Screening: Not Applicable     Date of Service:

## 2024-12-16 NOTE — TELEPHONE ENCOUNTER
Pt reaching out to schedule with general ophthalmologist.    I did sed 3 months supply to mail order pharmacy until seen.    Brian Wolf RN 2:34 PM 12/16/24

## 2024-12-16 NOTE — DISCHARGE INSTRUCTIONS
Your bloody diarrhea is likely due to an infection.  The vast majority of the time this is caused by a virus.  Given your absence of fever or abdominal pain, it is recommended to hold off on antibiotics unless the stool studies come back positive.  Your labs were all reassuring.  You were given IV fluids.  Make sure you are drinking plenty of fluids.  I recommend electrolyte rich solution such as Pedialyte or Body Armor.  You can continue to use Imodium as needed.  I will call you if the stool studies come back positive.  No news is good news.  Follow-up with your regular doctor with ongoing concerns.  Return to the ER with new or concerning symptoms.

## 2024-12-17 LAB
ATRIAL RATE - MUSE: 70 BPM
DIASTOLIC BLOOD PRESSURE - MUSE: NORMAL MMHG
INTERPRETATION ECG - MUSE: NORMAL
P AXIS - MUSE: 21 DEGREES
PR INTERVAL - MUSE: 158 MS
QRS DURATION - MUSE: 96 MS
QT - MUSE: 436 MS
QTC - MUSE: 470 MS
R AXIS - MUSE: -18 DEGREES
SYSTOLIC BLOOD PRESSURE - MUSE: NORMAL MMHG
T AXIS - MUSE: -6 DEGREES
VENTRICULAR RATE- MUSE: 70 BPM

## 2024-12-23 LAB
ADV 40+41 DNA STL QL NAA+NON-PROBE: NEGATIVE
ASTRO TYP 1-8 RNA STL QL NAA+NON-PROBE: NEGATIVE
C CAYETANENSIS DNA STL QL NAA+NON-PROBE: NEGATIVE
CAMPYLOBACTER DNA SPEC NAA+PROBE: NEGATIVE
CRYPTOSP DNA STL QL NAA+NON-PROBE: NEGATIVE
E COLI O157 DNA STL QL NAA+NON-PROBE: NEGATIVE
E HISTOLYT DNA STL QL NAA+NON-PROBE: NEGATIVE
EAEC ASTA GENE ISLT QL NAA+PROBE: POSITIVE
EC STX1+STX2 GENES STL QL NAA+NON-PROBE: POSITIVE
EPEC EAE GENE STL QL NAA+NON-PROBE: ABNORMAL
ETEC LTA+ST1A+ST1B TOX ST NAA+NON-PROBE: NEGATIVE
G LAMBLIA DNA STL QL NAA+NON-PROBE: NEGATIVE
NOROVIRUS GI+II RNA STL QL NAA+NON-PROBE: NEGATIVE
P SHIGELLOIDES DNA STL QL NAA+NON-PROBE: NEGATIVE
RVA RNA STL QL NAA+NON-PROBE: NEGATIVE
SALMONELLA SP RPOD STL QL NAA+PROBE: NEGATIVE
SAPO I+II+IV+V RNA STL QL NAA+NON-PROBE: NEGATIVE
SHIGELLA SP+EIEC IPAH ST NAA+NON-PROBE: NEGATIVE
V CHOLERAE DNA SPEC QL NAA+PROBE: NEGATIVE
VIBRIO DNA SPEC NAA+PROBE: NEGATIVE
Y ENTEROCOL DNA STL QL NAA+PROBE: NEGATIVE

## 2025-08-01 SDOH — HEALTH STABILITY: PHYSICAL HEALTH: ON AVERAGE, HOW MANY MINUTES DO YOU ENGAGE IN EXERCISE AT THIS LEVEL?: 30 MIN

## 2025-08-01 SDOH — HEALTH STABILITY: PHYSICAL HEALTH: ON AVERAGE, HOW MANY DAYS PER WEEK DO YOU ENGAGE IN MODERATE TO STRENUOUS EXERCISE (LIKE A BRISK WALK)?: 3 DAYS

## 2025-08-01 ASSESSMENT — SOCIAL DETERMINANTS OF HEALTH (SDOH): HOW OFTEN DO YOU GET TOGETHER WITH FRIENDS OR RELATIVES?: ONCE A WEEK

## 2025-08-06 ENCOUNTER — RESULTS FOLLOW-UP (OUTPATIENT)
Dept: FAMILY MEDICINE | Facility: CLINIC | Age: 60
End: 2025-08-06

## 2025-08-06 ENCOUNTER — OFFICE VISIT (OUTPATIENT)
Dept: FAMILY MEDICINE | Facility: CLINIC | Age: 60
End: 2025-08-06
Attending: FAMILY MEDICINE
Payer: COMMERCIAL

## 2025-08-06 VITALS
WEIGHT: 196 LBS | DIASTOLIC BLOOD PRESSURE: 78 MMHG | OXYGEN SATURATION: 94 % | HEIGHT: 67 IN | TEMPERATURE: 98.1 F | SYSTOLIC BLOOD PRESSURE: 136 MMHG | RESPIRATION RATE: 16 BRPM | HEART RATE: 78 BPM | BODY MASS INDEX: 30.76 KG/M2

## 2025-08-06 DIAGNOSIS — Z00.00 ROUTINE GENERAL MEDICAL EXAMINATION AT A HEALTH CARE FACILITY: Primary | ICD-10-CM

## 2025-08-06 DIAGNOSIS — E78.2 MIXED HYPERLIPIDEMIA: ICD-10-CM

## 2025-08-06 DIAGNOSIS — H61.21 IMPACTED CERUMEN OF RIGHT EAR: ICD-10-CM

## 2025-08-06 DIAGNOSIS — Z13.6 SCREENING FOR CARDIOVASCULAR CONDITION: ICD-10-CM

## 2025-08-06 DIAGNOSIS — Z12.5 SCREENING PSA (PROSTATE SPECIFIC ANTIGEN): ICD-10-CM

## 2025-08-06 LAB
APO A-I SERPL-MCNC: 25 MG/DL
CHOLEST SERPL-MCNC: 224 MG/DL
FASTING STATUS PATIENT QL REPORTED: YES
HDLC SERPL-MCNC: 43 MG/DL
LDLC SERPL CALC-MCNC: 164 MG/DL
NONHDLC SERPL-MCNC: 181 MG/DL
PSA SERPL DL<=0.01 NG/ML-MCNC: 0.94 NG/ML (ref 0–4.5)
TRIGL SERPL-MCNC: 87 MG/DL

## 2025-08-06 PROCEDURE — 1125F AMNT PAIN NOTED PAIN PRSNT: CPT | Performed by: FAMILY MEDICINE

## 2025-08-06 PROCEDURE — 99213 OFFICE O/P EST LOW 20 MIN: CPT | Mod: 25 | Performed by: FAMILY MEDICINE

## 2025-08-06 PROCEDURE — 3075F SYST BP GE 130 - 139MM HG: CPT | Performed by: FAMILY MEDICINE

## 2025-08-06 PROCEDURE — 69209 REMOVE IMPACTED EAR WAX UNI: CPT | Mod: RT | Performed by: FAMILY MEDICINE

## 2025-08-06 PROCEDURE — 36415 COLL VENOUS BLD VENIPUNCTURE: CPT | Performed by: FAMILY MEDICINE

## 2025-08-06 PROCEDURE — 80061 LIPID PANEL: CPT | Performed by: FAMILY MEDICINE

## 2025-08-06 PROCEDURE — 99396 PREV VISIT EST AGE 40-64: CPT | Mod: 25 | Performed by: FAMILY MEDICINE

## 2025-08-06 PROCEDURE — 3078F DIAST BP <80 MM HG: CPT | Performed by: FAMILY MEDICINE

## 2025-08-06 PROCEDURE — 83695 ASSAY OF LIPOPROTEIN(A): CPT | Performed by: FAMILY MEDICINE

## 2025-08-06 PROCEDURE — G0103 PSA SCREENING: HCPCS | Performed by: FAMILY MEDICINE

## 2025-08-06 RX ORDER — ATORVASTATIN CALCIUM 10 MG/1
10 TABLET, FILM COATED ORAL DAILY
Qty: 90 TABLET | Refills: 3 | Status: SHIPPED | OUTPATIENT
Start: 2025-08-06

## 2025-08-06 ASSESSMENT — PAIN SCALES - GENERAL: PAINLEVEL_OUTOF10: MILD PAIN (1)

## 2025-09-04 ENCOUNTER — OFFICE VISIT (OUTPATIENT)
Dept: FAMILY MEDICINE | Facility: CLINIC | Age: 60
End: 2025-09-04
Payer: COMMERCIAL

## 2025-09-04 VITALS
WEIGHT: 199 LBS | HEART RATE: 77 BPM | RESPIRATION RATE: 16 BRPM | HEIGHT: 67 IN | DIASTOLIC BLOOD PRESSURE: 102 MMHG | TEMPERATURE: 97.5 F | BODY MASS INDEX: 31.23 KG/M2 | SYSTOLIC BLOOD PRESSURE: 156 MMHG | OXYGEN SATURATION: 93 %

## 2025-09-04 DIAGNOSIS — R10.9 RIGHT FLANK PAIN: Primary | ICD-10-CM

## 2025-09-04 LAB
ALBUMIN SERPL BCG-MCNC: 4.2 G/DL (ref 3.5–5.2)
ALBUMIN UR-MCNC: NEGATIVE MG/DL
ALP SERPL-CCNC: 76 U/L (ref 40–150)
ALT SERPL W P-5'-P-CCNC: 22 U/L (ref 0–70)
ANION GAP SERPL CALCULATED.3IONS-SCNC: 11 MMOL/L (ref 7–15)
APPEARANCE UR: CLEAR
AST SERPL W P-5'-P-CCNC: 23 U/L (ref 0–45)
BACTERIA #/AREA URNS HPF: ABNORMAL /HPF
BILIRUB SERPL-MCNC: 0.5 MG/DL
BILIRUB UR QL STRIP: NEGATIVE
BUN SERPL-MCNC: 15 MG/DL (ref 8–23)
CALCIUM SERPL-MCNC: 8.9 MG/DL (ref 8.8–10.4)
CHLORIDE SERPL-SCNC: 104 MMOL/L (ref 98–107)
COLOR UR AUTO: YELLOW
CREAT SERPL-MCNC: 0.81 MG/DL (ref 0.67–1.17)
EGFRCR SERPLBLD CKD-EPI 2021: >90 ML/MIN/1.73M2
ERYTHROCYTE [DISTWIDTH] IN BLOOD BY AUTOMATED COUNT: 21.8 % (ref 10–15)
GLUCOSE SERPL-MCNC: 102 MG/DL (ref 70–99)
GLUCOSE UR STRIP-MCNC: NEGATIVE MG/DL
HCO3 SERPL-SCNC: 26 MMOL/L (ref 22–29)
HCT VFR BLD AUTO: 42.9 % (ref 40–53)
HGB BLD-MCNC: 13.4 G/DL (ref 13.3–17.7)
HGB UR QL STRIP: ABNORMAL
KETONES UR STRIP-MCNC: NEGATIVE MG/DL
LEUKOCYTE ESTERASE UR QL STRIP: NEGATIVE
MCH RBC QN AUTO: 22.2 PG (ref 26.5–33)
MCHC RBC AUTO-ENTMCNC: 31.2 G/DL (ref 31.5–36.5)
MCV RBC AUTO: 71.1 FL (ref 78–100)
NITRATE UR QL: NEGATIVE
PH UR STRIP: 6 [PH] (ref 5–7)
PLATELET # BLD AUTO: 356 10E3/UL (ref 150–450)
POTASSIUM SERPL-SCNC: 4.1 MMOL/L (ref 3.4–5.3)
PROT SERPL-MCNC: 7.3 G/DL (ref 6.4–8.3)
RBC # BLD AUTO: 6.03 10E6/UL (ref 4.4–5.9)
RBC #/AREA URNS AUTO: ABNORMAL /HPF
SODIUM SERPL-SCNC: 141 MMOL/L (ref 135–145)
SP GR UR STRIP: 1.02 (ref 1–1.03)
SQUAMOUS #/AREA URNS AUTO: ABNORMAL /LPF
UROBILINOGEN UR STRIP-ACNC: 0.2 E.U./DL
WBC # BLD AUTO: 5.07 10E3/UL (ref 4–11)
WBC #/AREA URNS AUTO: ABNORMAL /HPF

## 2025-09-04 ASSESSMENT — PAIN SCALES - GENERAL: PAINLEVEL_OUTOF10: NO PAIN (0)

## (undated) DEVICE — EYE KNIFE STILETTO VISITEC 1.1MM ANG 45DEG SIDEPORT 376620

## (undated) DEVICE — TAPE MICROPORE 2"X1.5YD 1530S-2

## (undated) DEVICE — SOL WATER IRRIG 500ML BOTTLE 2F7113

## (undated) DEVICE — SU PLAIN 6-0 TG140-8 18" 1735G

## (undated) DEVICE — KIT ENDO TURNOVER/PROCEDURE W/CLEAN A SCOPE LINERS 103888

## (undated) DEVICE — EYE KIT AUTO GAS FOR CONSTELLATION 8065751014

## (undated) DEVICE — EYE PROBE LASER 25GA FLEX RFID 8065751114

## (undated) DEVICE — EYE NDL RETROBULBAR ATKINSON 25GA 1.5" 581637

## (undated) DEVICE — GLOVE PROTEXIS MICRO 6.0  2D73PM60

## (undated) DEVICE — PACK CATARACT CUSTOM ASC SEY15CPUMC

## (undated) DEVICE — LINEN TOWEL PACK X5 5464

## (undated) DEVICE — EYE CANN IRR 25GA CYSTOTOME 581610

## (undated) DEVICE — EYE DRAPE MICROSCOPE UNIVERSAL (BIOM FULL) 08-MK55140

## (undated) DEVICE — STRAP KNEE/BODY 31143004

## (undated) DEVICE — SU SILK 2-0 TIE 12X30" A305H

## (undated) DEVICE — TUBING SUCTION MEDI-VAC 1/4"X20' N620A

## (undated) DEVICE — EYE KNIFE SLIT XSTAR VISITEC 2.5MM 45DEG BEVEL UP 373725

## (undated) DEVICE — EYE TIP IRRIGATION & ASPIRATION POLYMER 35D BENT 8065751511

## (undated) DEVICE — EYE SHIELD PLASTIC

## (undated) DEVICE — EYE PACK CUSTOM ANTERIOR 30DEG TIP CENTURION PPK6682-04

## (undated) DEVICE — TAPE MICROPORE 1"X1.5YD 1530S-1

## (undated) DEVICE — GLOVE PROTEXIS MICRO 7.0  2D73PM70

## (undated) DEVICE — ESU CORD BIPOLAR GREEN 10-4000

## (undated) DEVICE — ENDO TRAP POLYP QUICK CATCH 710201

## (undated) DEVICE — EYE SOL BSS 500ML BAG 0065-1795-04

## (undated) DEVICE — EYE CANN IRR 27GA ANTERIOR CHAMBER 581280

## (undated) DEVICE — EYE PROBE ESU DIATHERMY DSP 25GA 339.21

## (undated) DEVICE — EYE PACK 25GA CONSTELLATION 10,000 CPM PPK9380-04

## (undated) DEVICE — GLOVE PROTEXIS BLUE W/NEU-THERA 7.0  2D73EB70

## (undated) DEVICE — EYE GAS ISPAN SF6 PER USE/CC/ML 8065797005

## (undated) DEVICE — EYE CANN SOFT TIP 25GA FOR VALVED SET 8065149530

## (undated) DEVICE — EYE SHIELD PLASTIC CLEAR UNIVERSAL K9-6050

## (undated) DEVICE — PACK VITRECTOMY/RET CUSTOM ASC PQ15VRU12

## (undated) DEVICE — SU MERSILENE 5-0 S-24DA 18" 1761G

## (undated) DEVICE — GLOVE PROTEXIS W/NEU-THERA 7.0  2D73TE70

## (undated) DEVICE — TAPE DURAPORE 2"X1.5YD SILK 1538S-2

## (undated) RX ORDER — PROPOFOL 10 MG/ML
INJECTION, EMULSION INTRAVENOUS
Status: DISPENSED
Start: 2022-12-13

## (undated) RX ORDER — FENTANYL CITRATE 50 UG/ML
INJECTION, SOLUTION INTRAMUSCULAR; INTRAVENOUS
Status: DISPENSED
Start: 2017-06-21

## (undated) RX ORDER — SODIUM CHLORIDE, SODIUM LACTATE, POTASSIUM CHLORIDE, CALCIUM CHLORIDE 600; 310; 30; 20 MG/100ML; MG/100ML; MG/100ML; MG/100ML
INJECTION, SOLUTION INTRAVENOUS
Status: DISPENSED
Start: 2022-07-31

## (undated) RX ORDER — FENTANYL CITRATE 50 UG/ML
INJECTION, SOLUTION INTRAMUSCULAR; INTRAVENOUS
Status: DISPENSED
Start: 2022-12-13

## (undated) RX ORDER — ONDANSETRON 2 MG/ML
INJECTION INTRAMUSCULAR; INTRAVENOUS
Status: DISPENSED
Start: 2022-12-13

## (undated) RX ORDER — PROPOFOL 10 MG/ML
INJECTION, EMULSION INTRAVENOUS
Status: DISPENSED
Start: 2022-07-31

## (undated) RX ORDER — FENTANYL CITRATE 50 UG/ML
INJECTION, SOLUTION INTRAMUSCULAR; INTRAVENOUS
Status: DISPENSED
Start: 2022-07-31

## (undated) RX ORDER — HYDROMORPHONE HYDROCHLORIDE 1 MG/ML
INJECTION, SOLUTION INTRAMUSCULAR; INTRAVENOUS; SUBCUTANEOUS
Status: DISPENSED
Start: 2022-07-31

## (undated) RX ORDER — ACETAMINOPHEN 325 MG/1
TABLET ORAL
Status: DISPENSED
Start: 2022-12-13

## (undated) RX ORDER — KETOROLAC TROMETHAMINE 30 MG/ML
INJECTION, SOLUTION INTRAMUSCULAR; INTRAVENOUS
Status: DISPENSED
Start: 2022-07-31

## (undated) RX ORDER — PROPARACAINE HYDROCHLORIDE 5 MG/ML
SOLUTION/ DROPS OPHTHALMIC
Status: DISPENSED
Start: 2022-07-31